# Patient Record
Sex: FEMALE | Race: ASIAN | NOT HISPANIC OR LATINO | Employment: UNEMPLOYED | URBAN - METROPOLITAN AREA
[De-identification: names, ages, dates, MRNs, and addresses within clinical notes are randomized per-mention and may not be internally consistent; named-entity substitution may affect disease eponyms.]

---

## 2018-09-13 DIAGNOSIS — J18.9 PNEUMONIA: Primary | ICD-10-CM

## 2019-05-30 DIAGNOSIS — I50.30 UNSPECIFIED DIASTOLIC (CONGESTIVE) HEART FAILURE: Primary | ICD-10-CM

## 2020-01-09 DIAGNOSIS — I27.0 IDIOPATHIC PULMONARY ARTERIOSCLEROSIS: ICD-10-CM

## 2020-01-09 DIAGNOSIS — I50.30 DIASTOLIC HF (HEART FAILURE): Primary | ICD-10-CM

## 2020-01-09 DIAGNOSIS — I27.21 PULMONARY ARTERIAL HYPERTENSION: ICD-10-CM

## 2022-07-18 ENCOUNTER — HOSPITAL ENCOUNTER (OUTPATIENT)
Facility: HOSPITAL | Age: 87
Discharge: HOME | End: 2022-07-19
Attending: EMERGENCY MEDICINE | Admitting: HOSPITALIST
Payer: MEDICARE

## 2022-07-18 DIAGNOSIS — R55 NEAR SYNCOPE: Primary | ICD-10-CM

## 2022-07-18 DIAGNOSIS — R55 SYNCOPE: ICD-10-CM

## 2022-07-18 DIAGNOSIS — U07.1 COVID-19: ICD-10-CM

## 2022-07-18 LAB
ALBUMIN SERPL BCP-MCNC: 3.4 G/DL (ref 3.5–5.2)
ALP SERPL-CCNC: 55 U/L (ref 55–135)
ALT SERPL W/O P-5'-P-CCNC: 14 U/L (ref 10–44)
ANION GAP SERPL CALC-SCNC: 12 MMOL/L (ref 8–16)
AST SERPL-CCNC: 31 U/L (ref 10–40)
BACTERIA #/AREA URNS HPF: NORMAL /HPF
BASOPHILS # BLD AUTO: 0.03 K/UL (ref 0–0.2)
BASOPHILS NFR BLD: 0.2 % (ref 0–1.9)
BILIRUB SERPL-MCNC: 0.5 MG/DL (ref 0.1–1)
BILIRUB UR QL STRIP: NEGATIVE
BNP SERPL-MCNC: 822 PG/ML (ref 0–99)
BUN SERPL-MCNC: 39 MG/DL (ref 10–30)
CALCIUM SERPL-MCNC: 9.9 MG/DL (ref 8.7–10.5)
CHLORIDE SERPL-SCNC: 105 MMOL/L (ref 95–110)
CK SERPL-CCNC: 51 U/L (ref 20–180)
CLARITY UR: CLEAR
CO2 SERPL-SCNC: 22 MMOL/L (ref 23–29)
COLOR UR: YELLOW
CREAT SERPL-MCNC: 1.1 MG/DL (ref 0.5–1.4)
CRP SERPL-MCNC: 12.2 MG/L (ref 0–8.2)
CTP QC/QA: YES
D DIMER PPP IA.FEU-MCNC: 1.86 MG/L FEU
DIFFERENTIAL METHOD: ABNORMAL
EOSINOPHIL # BLD AUTO: 0 K/UL (ref 0–0.5)
EOSINOPHIL NFR BLD: 0.3 % (ref 0–8)
ERYTHROCYTE [DISTWIDTH] IN BLOOD BY AUTOMATED COUNT: 14.1 % (ref 11.5–14.5)
EST. GFR  (AFRICAN AMERICAN): 50 ML/MIN/1.73 M^2
EST. GFR  (NON AFRICAN AMERICAN): 43 ML/MIN/1.73 M^2
FERRITIN SERPL-MCNC: 174 NG/ML (ref 20–300)
GLUCOSE SERPL-MCNC: 159 MG/DL (ref 70–110)
GLUCOSE UR QL STRIP: NEGATIVE
HCT VFR BLD AUTO: 35.4 % (ref 37–48.5)
HGB BLD-MCNC: 11.8 G/DL (ref 12–16)
HGB UR QL STRIP: NEGATIVE
HYALINE CASTS #/AREA URNS LPF: 0 /LPF
IMM GRANULOCYTES # BLD AUTO: 0.06 K/UL (ref 0–0.04)
IMM GRANULOCYTES NFR BLD AUTO: 0.4 % (ref 0–0.5)
KETONES UR QL STRIP: NEGATIVE
LACTATE SERPL-SCNC: 1.1 MMOL/L (ref 0.5–2.2)
LACTATE SERPL-SCNC: 2.1 MMOL/L (ref 0.5–2.2)
LDH SERPL L TO P-CCNC: 579 U/L (ref 110–260)
LEUKOCYTE ESTERASE UR QL STRIP: NEGATIVE
LIPASE SERPL-CCNC: 12 U/L (ref 4–60)
LYMPHOCYTES # BLD AUTO: 1 K/UL (ref 1–4.8)
LYMPHOCYTES NFR BLD: 6.9 % (ref 18–48)
MAGNESIUM SERPL-MCNC: 2.1 MG/DL (ref 1.6–2.6)
MCH RBC QN AUTO: 28.1 PG (ref 27–31)
MCHC RBC AUTO-ENTMCNC: 33.3 G/DL (ref 32–36)
MCV RBC AUTO: 84 FL (ref 82–98)
MICROSCOPIC COMMENT: NORMAL
MONOCYTES # BLD AUTO: 1 K/UL (ref 0.3–1)
MONOCYTES NFR BLD: 6.8 % (ref 4–15)
NEUTROPHILS # BLD AUTO: 12.5 K/UL (ref 1.8–7.7)
NEUTROPHILS NFR BLD: 85.4 % (ref 38–73)
NITRITE UR QL STRIP: NEGATIVE
NRBC BLD-RTO: 0 /100 WBC
PH UR STRIP: 7 [PH] (ref 5–8)
PLATELET # BLD AUTO: 206 K/UL (ref 150–450)
PMV BLD AUTO: 11.7 FL (ref 9.2–12.9)
POTASSIUM SERPL-SCNC: 4.9 MMOL/L (ref 3.5–5.1)
PROCALCITONIN SERPL IA-MCNC: 0.02 NG/ML
PROT SERPL-MCNC: 8.4 G/DL (ref 6–8.4)
PROT UR QL STRIP: ABNORMAL
RBC # BLD AUTO: 4.2 M/UL (ref 4–5.4)
RBC #/AREA URNS HPF: 1 /HPF (ref 0–4)
SARS-COV-2 RDRP RESP QL NAA+PROBE: POSITIVE
SODIUM SERPL-SCNC: 139 MMOL/L (ref 136–145)
SP GR UR STRIP: 1.01 (ref 1–1.03)
TROPONIN I SERPL DL<=0.01 NG/ML-MCNC: 0.06 NG/ML (ref 0–0.03)
TROPONIN I SERPL DL<=0.01 NG/ML-MCNC: 0.1 NG/ML (ref 0–0.03)
URN SPEC COLLECT METH UR: ABNORMAL
UROBILINOGEN UR STRIP-ACNC: NEGATIVE EU/DL
WBC # BLD AUTO: 14.59 K/UL (ref 3.9–12.7)
WBC #/AREA URNS HPF: 1 /HPF (ref 0–5)

## 2022-07-18 PROCEDURE — 83690 ASSAY OF LIPASE: CPT | Performed by: EMERGENCY MEDICINE

## 2022-07-18 PROCEDURE — 27100098 HC SPACER

## 2022-07-18 PROCEDURE — 85025 COMPLETE CBC W/AUTO DIFF WBC: CPT | Performed by: EMERGENCY MEDICINE

## 2022-07-18 PROCEDURE — 25000003 PHARM REV CODE 250: Performed by: EMERGENCY MEDICINE

## 2022-07-18 PROCEDURE — 82728 ASSAY OF FERRITIN: CPT | Performed by: EMERGENCY MEDICINE

## 2022-07-18 PROCEDURE — 99285 EMERGENCY DEPT VISIT HI MDM: CPT | Mod: 25

## 2022-07-18 PROCEDURE — 85379 FIBRIN DEGRADATION QUANT: CPT | Performed by: EMERGENCY MEDICINE

## 2022-07-18 PROCEDURE — 94761 N-INVAS EAR/PLS OXIMETRY MLT: CPT

## 2022-07-18 PROCEDURE — 25000242 PHARM REV CODE 250 ALT 637 W/ HCPCS: Performed by: PHYSICIAN ASSISTANT

## 2022-07-18 PROCEDURE — 83615 LACTATE (LD) (LDH) ENZYME: CPT | Performed by: EMERGENCY MEDICINE

## 2022-07-18 PROCEDURE — 84145 PROCALCITONIN (PCT): CPT | Performed by: EMERGENCY MEDICINE

## 2022-07-18 PROCEDURE — 83880 ASSAY OF NATRIURETIC PEPTIDE: CPT | Performed by: EMERGENCY MEDICINE

## 2022-07-18 PROCEDURE — 84484 ASSAY OF TROPONIN QUANT: CPT | Mod: 91 | Performed by: PHYSICIAN ASSISTANT

## 2022-07-18 PROCEDURE — 36000 PLACE NEEDLE IN VEIN: CPT

## 2022-07-18 PROCEDURE — 80053 COMPREHEN METABOLIC PANEL: CPT | Performed by: EMERGENCY MEDICINE

## 2022-07-18 PROCEDURE — U0002 COVID-19 LAB TEST NON-CDC: HCPCS | Performed by: EMERGENCY MEDICINE

## 2022-07-18 PROCEDURE — 84484 ASSAY OF TROPONIN QUANT: CPT | Performed by: EMERGENCY MEDICINE

## 2022-07-18 PROCEDURE — 82550 ASSAY OF CK (CPK): CPT | Performed by: EMERGENCY MEDICINE

## 2022-07-18 PROCEDURE — G0378 HOSPITAL OBSERVATION PER HR: HCPCS

## 2022-07-18 PROCEDURE — 87040 BLOOD CULTURE FOR BACTERIA: CPT | Mod: 59 | Performed by: EMERGENCY MEDICINE

## 2022-07-18 PROCEDURE — 94640 AIRWAY INHALATION TREATMENT: CPT

## 2022-07-18 PROCEDURE — 83605 ASSAY OF LACTIC ACID: CPT | Mod: 91 | Performed by: EMERGENCY MEDICINE

## 2022-07-18 PROCEDURE — 86140 C-REACTIVE PROTEIN: CPT | Performed by: EMERGENCY MEDICINE

## 2022-07-18 PROCEDURE — 81000 URINALYSIS NONAUTO W/SCOPE: CPT | Performed by: EMERGENCY MEDICINE

## 2022-07-18 PROCEDURE — 83735 ASSAY OF MAGNESIUM: CPT | Performed by: EMERGENCY MEDICINE

## 2022-07-18 PROCEDURE — 83036 HEMOGLOBIN GLYCOSYLATED A1C: CPT | Performed by: PHYSICIAN ASSISTANT

## 2022-07-18 RX ORDER — ALBUTEROL SULFATE 90 UG/1
2 AEROSOL, METERED RESPIRATORY (INHALATION) EVERY 6 HOURS
Status: DISCONTINUED | OUTPATIENT
Start: 2022-07-18 | End: 2022-07-18

## 2022-07-18 RX ORDER — ATORVASTATIN CALCIUM 10 MG/1
10 TABLET, FILM COATED ORAL DAILY
Status: DISCONTINUED | OUTPATIENT
Start: 2022-07-19 | End: 2022-07-19 | Stop reason: HOSPADM

## 2022-07-18 RX ORDER — IBUPROFEN 200 MG
24 TABLET ORAL
Status: DISCONTINUED | OUTPATIENT
Start: 2022-07-18 | End: 2022-07-19 | Stop reason: HOSPADM

## 2022-07-18 RX ORDER — QUETIAPINE FUMARATE 25 MG/1
25 TABLET, FILM COATED ORAL
Status: DISCONTINUED | OUTPATIENT
Start: 2022-07-18 | End: 2022-07-18

## 2022-07-18 RX ORDER — GLUCAGON 1 MG
1 KIT INJECTION
Status: DISCONTINUED | OUTPATIENT
Start: 2022-07-18 | End: 2022-07-19 | Stop reason: HOSPADM

## 2022-07-18 RX ORDER — NIFEDIPINE 30 MG/1
60 TABLET, EXTENDED RELEASE ORAL DAILY
Refills: 1 | Status: DISCONTINUED | OUTPATIENT
Start: 2022-07-19 | End: 2022-07-18

## 2022-07-18 RX ORDER — ACETAMINOPHEN 500 MG
500 TABLET ORAL EVERY 6 HOURS PRN
Status: DISCONTINUED | OUTPATIENT
Start: 2022-07-18 | End: 2022-07-19 | Stop reason: HOSPADM

## 2022-07-18 RX ORDER — INSULIN ASPART 100 [IU]/ML
0-5 INJECTION, SOLUTION INTRAVENOUS; SUBCUTANEOUS
Status: DISCONTINUED | OUTPATIENT
Start: 2022-07-18 | End: 2022-07-19 | Stop reason: HOSPADM

## 2022-07-18 RX ORDER — ALBUTEROL SULFATE 90 UG/1
2 AEROSOL, METERED RESPIRATORY (INHALATION) 2 TIMES DAILY
Status: DISCONTINUED | OUTPATIENT
Start: 2022-07-19 | End: 2022-07-19 | Stop reason: HOSPADM

## 2022-07-18 RX ORDER — IBUPROFEN 200 MG
16 TABLET ORAL
Status: DISCONTINUED | OUTPATIENT
Start: 2022-07-18 | End: 2022-07-19 | Stop reason: HOSPADM

## 2022-07-18 RX ORDER — NIFEDIPINE 30 MG/1
30 TABLET, EXTENDED RELEASE ORAL DAILY
Status: DISCONTINUED | OUTPATIENT
Start: 2022-07-19 | End: 2022-07-19 | Stop reason: HOSPADM

## 2022-07-18 RX ORDER — AMOXICILLIN 250 MG
1 CAPSULE ORAL DAILY PRN
Status: DISCONTINUED | OUTPATIENT
Start: 2022-07-18 | End: 2022-07-19 | Stop reason: HOSPADM

## 2022-07-18 RX ORDER — TALC
6 POWDER (GRAM) TOPICAL NIGHTLY PRN
Status: DISCONTINUED | OUTPATIENT
Start: 2022-07-18 | End: 2022-07-19 | Stop reason: HOSPADM

## 2022-07-18 RX ORDER — SODIUM CHLORIDE 0.9 % (FLUSH) 0.9 %
10 SYRINGE (ML) INJECTION
Status: DISCONTINUED | OUTPATIENT
Start: 2022-07-18 | End: 2022-07-19 | Stop reason: HOSPADM

## 2022-07-18 RX ORDER — BENZONATATE 100 MG/1
100 CAPSULE ORAL 3 TIMES DAILY PRN
Status: DISCONTINUED | OUTPATIENT
Start: 2022-07-18 | End: 2022-07-19 | Stop reason: HOSPADM

## 2022-07-18 RX ORDER — SODIUM CHLORIDE 9 MG/ML
125 INJECTION, SOLUTION INTRAVENOUS ONCE
Status: COMPLETED | OUTPATIENT
Start: 2022-07-18 | End: 2022-07-18

## 2022-07-18 RX ORDER — FUROSEMIDE 20 MG/1
20 TABLET ORAL DAILY
Status: DISCONTINUED | OUTPATIENT
Start: 2022-07-19 | End: 2022-07-19 | Stop reason: HOSPADM

## 2022-07-18 RX ORDER — ASCORBIC ACID 500 MG
500 TABLET ORAL 2 TIMES DAILY
Status: DISCONTINUED | OUTPATIENT
Start: 2022-07-18 | End: 2022-07-19 | Stop reason: HOSPADM

## 2022-07-18 RX ADMIN — SODIUM CHLORIDE 125 ML/HR: 0.9 INJECTION, SOLUTION INTRAVENOUS at 04:07

## 2022-07-18 RX ADMIN — ALBUTEROL SULFATE 2 PUFF: 90 AEROSOL, METERED RESPIRATORY (INHALATION) at 07:07
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
## 2022-07-18 NOTE — ASSESSMENT & PLAN NOTE
Lab Results   Component Value Date    HGBA1C 6.4 (H) 09/12/2013     Will start sliding scale insulin, diabetic diet, goal -180

## 2022-07-18 NOTE — ED NOTES
Noted pt to be restless. Pulling at lines and attempting to climb out of bed. Daughter having difficulty to redirect pt. Daughter stated that she will be going home soon. Pt will need a 'sitter'. Charge Nurse Yesenia made aware of situation.

## 2022-07-19 VITALS
TEMPERATURE: 98 F | WEIGHT: 127.88 LBS | OXYGEN SATURATION: 94 % | HEIGHT: 63 IN | DIASTOLIC BLOOD PRESSURE: 67 MMHG | SYSTOLIC BLOOD PRESSURE: 158 MMHG | BODY MASS INDEX: 22.66 KG/M2 | HEART RATE: 70 BPM | RESPIRATION RATE: 16 BRPM

## 2022-07-19 DIAGNOSIS — U07.1 COVID-19 VIRUS DETECTED: ICD-10-CM

## 2022-07-19 LAB
ALBUMIN SERPL BCP-MCNC: 3.3 G/DL (ref 3.5–5.2)
ALP SERPL-CCNC: 50 U/L (ref 55–135)
ALT SERPL W/O P-5'-P-CCNC: 12 U/L (ref 10–44)
ANION GAP SERPL CALC-SCNC: 12 MMOL/L (ref 8–16)
ASCENDING AORTA: 3.27 CM
AST SERPL-CCNC: 22 U/L (ref 10–40)
AV INDEX (PROSTH): 0.39
AV MEAN GRADIENT: 23 MMHG
AV PEAK GRADIENT: 41 MMHG
AV VALVE AREA: 0.96 CM2
AV VELOCITY RATIO: 0.36
BASOPHILS # BLD AUTO: 0.02 K/UL (ref 0–0.2)
BASOPHILS NFR BLD: 0.2 % (ref 0–1.9)
BILIRUB SERPL-MCNC: 0.6 MG/DL (ref 0.1–1)
BSA FOR ECHO PROCEDURE: 1.61 M2
BUN SERPL-MCNC: 29 MG/DL (ref 10–30)
CALCIUM SERPL-MCNC: 9.2 MG/DL (ref 8.7–10.5)
CHLORIDE SERPL-SCNC: 107 MMOL/L (ref 95–110)
CO2 SERPL-SCNC: 21 MMOL/L (ref 23–29)
CREAT SERPL-MCNC: 1 MG/DL (ref 0.5–1.4)
CV ECHO LV RWT: 0.52 CM
DIFFERENTIAL METHOD: ABNORMAL
DOP CALC AO PEAK VEL: 3.2 M/S
DOP CALC AO VTI: 69.15 CM
DOP CALC LVOT AREA: 2.5 CM2
DOP CALC LVOT DIAMETER: 1.77 CM
DOP CALC LVOT PEAK VEL: 1.14 M/S
DOP CALC LVOT STROKE VOLUME: 66.5 CM3
DOP CALCLVOT PEAK VEL VTI: 27.04 CM
E WAVE DECELERATION TIME: 135.72 MSEC
E/A RATIO: 1.45
E/E' RATIO: 31 M/S
ECHO LV POSTERIOR WALL: 1.09 CM (ref 0.6–1.1)
EJECTION FRACTION: 70 %
EOSINOPHIL # BLD AUTO: 0 K/UL (ref 0–0.5)
EOSINOPHIL NFR BLD: 0 % (ref 0–8)
ERYTHROCYTE [DISTWIDTH] IN BLOOD BY AUTOMATED COUNT: 14.3 % (ref 11.5–14.5)
EST. GFR  (AFRICAN AMERICAN): 56 ML/MIN/1.73 M^2
EST. GFR  (NON AFRICAN AMERICAN): 49 ML/MIN/1.73 M^2
ESTIMATED AVG GLUCOSE: 146 MG/DL (ref 68–131)
FRACTIONAL SHORTENING: 25 % (ref 28–44)
GLUCOSE SERPL-MCNC: 177 MG/DL (ref 70–110)
HBA1C MFR BLD: 6.7 % (ref 4–5.6)
HCT VFR BLD AUTO: 34.4 % (ref 37–48.5)
HGB BLD-MCNC: 10.8 G/DL (ref 12–16)
HR MV ECHO: 71 BPM
IMM GRANULOCYTES # BLD AUTO: 0.05 K/UL (ref 0–0.04)
IMM GRANULOCYTES NFR BLD AUTO: 0.5 % (ref 0–0.5)
INTERVENTRICULAR SEPTUM: 1.13 CM (ref 0.6–1.1)
IVRT: 62.28 MSEC
LA MAJOR: 6.98 CM
LA MINOR: 6.47 CM
LA WIDTH: 4.66 CM
LEFT ATRIUM SIZE: 3.54 CM
LEFT ATRIUM VOLUME INDEX: 58.9 ML/M2
LEFT ATRIUM VOLUME: 94.16 CM3
LEFT INTERNAL DIMENSION IN SYSTOLE: 3.12 CM (ref 2.1–4)
LEFT VENTRICLE DIASTOLIC VOLUME INDEX: 47.88 ML/M2
LEFT VENTRICLE DIASTOLIC VOLUME: 76.61 ML
LEFT VENTRICLE MASS INDEX: 98 G/M2
LEFT VENTRICLE SYSTOLIC VOLUME INDEX: 24.1 ML/M2
LEFT VENTRICLE SYSTOLIC VOLUME: 38.48 ML
LEFT VENTRICULAR INTERNAL DIMENSION IN DIASTOLE: 4.16 CM (ref 3.5–6)
LEFT VENTRICULAR MASS: 156.77 G
LV LATERAL E/E' RATIO: 31 M/S
LV SEPTAL E/E' RATIO: 31 M/S
LYMPHOCYTES # BLD AUTO: 0.8 K/UL (ref 1–4.8)
LYMPHOCYTES NFR BLD: 7.1 % (ref 18–48)
MCH RBC QN AUTO: 27.2 PG (ref 27–31)
MCHC RBC AUTO-ENTMCNC: 31.4 G/DL (ref 32–36)
MCV RBC AUTO: 87 FL (ref 82–98)
MONOCYTES # BLD AUTO: 1 K/UL (ref 0.3–1)
MONOCYTES NFR BLD: 9.3 % (ref 4–15)
MV MEAN GRADIENT: 4 MMHG
MV PEAK A VEL: 1.07 M/S
MV PEAK E VEL: 1.55 M/S
MV STENOSIS PRESSURE HALF TIME: 75 MS
MV VALVE AREA P 1/2 METHOD: 2.93 CM2
NEUTROPHILS # BLD AUTO: 8.9 K/UL (ref 1.8–7.7)
NEUTROPHILS NFR BLD: 82.9 % (ref 38–73)
NRBC BLD-RTO: 0 /100 WBC
PISA TR MAX VEL: 3.26 M/S
PLATELET # BLD AUTO: 203 K/UL (ref 150–450)
PMV BLD AUTO: 12.4 FL (ref 9.2–12.9)
POCT GLUCOSE: 136 MG/DL (ref 70–110)
POCT GLUCOSE: 177 MG/DL (ref 70–110)
POTASSIUM SERPL-SCNC: 4.7 MMOL/L (ref 3.5–5.1)
PROT SERPL-MCNC: 7.9 G/DL (ref 6–8.4)
PV PEAK VELOCITY: 0.93 CM/S
RA MAJOR: 5.74 CM
RA PRESSURE: 15 MMHG
RA WIDTH: 3.93 CM
RBC # BLD AUTO: 3.97 M/UL (ref 4–5.4)
RIGHT VENTRICULAR END-DIASTOLIC DIMENSION: 3.35 CM
RV TISSUE DOPPLER FREE WALL SYSTOLIC VELOCITY 1 (APICAL 4 CHAMBER VIEW): 12.97 CM/S
SINUS: 3.21 CM
SODIUM SERPL-SCNC: 140 MMOL/L (ref 136–145)
STJ: 2.15 CM
TDI LATERAL: 0.05 M/S
TDI SEPTAL: 0.05 M/S
TDI: 0.05 M/S
TR MAX PG: 43 MMHG
TRICUSPID ANNULAR PLANE SYSTOLIC EXCURSION: 1.91 CM
TROPONIN I SERPL DL<=0.01 NG/ML-MCNC: 0.1 NG/ML (ref 0–0.03)
TV REST PULMONARY ARTERY PRESSURE: 58 MMHG
WBC # BLD AUTO: 10.73 K/UL (ref 3.9–12.7)

## 2022-07-19 PROCEDURE — 97161 PT EVAL LOW COMPLEX 20 MIN: CPT

## 2022-07-19 PROCEDURE — 94760 N-INVAS EAR/PLS OXIMETRY 1: CPT

## 2022-07-19 PROCEDURE — 85025 COMPLETE CBC W/AUTO DIFF WBC: CPT | Performed by: PHYSICIAN ASSISTANT

## 2022-07-19 PROCEDURE — 97535 SELF CARE MNGMENT TRAINING: CPT

## 2022-07-19 PROCEDURE — 25000003 PHARM REV CODE 250: Performed by: PHYSICIAN ASSISTANT

## 2022-07-19 PROCEDURE — 36415 COLL VENOUS BLD VENIPUNCTURE: CPT | Performed by: PHYSICIAN ASSISTANT

## 2022-07-19 PROCEDURE — 84484 ASSAY OF TROPONIN QUANT: CPT | Performed by: PHYSICIAN ASSISTANT

## 2022-07-19 PROCEDURE — G0378 HOSPITAL OBSERVATION PER HR: HCPCS

## 2022-07-19 PROCEDURE — 97165 OT EVAL LOW COMPLEX 30 MIN: CPT

## 2022-07-19 PROCEDURE — 25000003 PHARM REV CODE 250: Performed by: NURSE PRACTITIONER

## 2022-07-19 PROCEDURE — 94640 AIRWAY INHALATION TREATMENT: CPT

## 2022-07-19 PROCEDURE — 80053 COMPREHEN METABOLIC PANEL: CPT | Performed by: PHYSICIAN ASSISTANT

## 2022-07-19 RX ORDER — ASPIRIN 81 MG/1
81 TABLET ORAL DAILY
Qty: 30 TABLET | Refills: 0 | Status: SHIPPED | OUTPATIENT
Start: 2022-07-20 | End: 2024-03-12

## 2022-07-19 RX ORDER — ASPIRIN 81 MG/1
81 TABLET ORAL DAILY
Status: DISCONTINUED | OUTPATIENT
Start: 2022-07-19 | End: 2022-07-19 | Stop reason: HOSPADM

## 2022-07-19 RX ADMIN — NIFEDIPINE 30 MG: 30 TABLET, FILM COATED, EXTENDED RELEASE ORAL at 12:07

## 2022-07-19 RX ADMIN — ALBUTEROL SULFATE 2 PUFF: 90 AEROSOL, METERED RESPIRATORY (INHALATION) at 09:07

## 2022-07-19 RX ADMIN — ATORVASTATIN CALCIUM 10 MG: 10 TABLET, FILM COATED ORAL at 12:07

## 2022-07-19 RX ADMIN — THERA TABS 1 TABLET: TAB at 12:07

## 2022-07-19 RX ADMIN — OXYCODONE HYDROCHLORIDE AND ACETAMINOPHEN 500 MG: 500 TABLET ORAL at 12:07

## 2022-07-19 RX ADMIN — ACETAMINOPHEN 500 MG: 500 TABLET ORAL at 12:07

## 2022-07-19 RX ADMIN — Medication 6 MG: at 12:07

## 2022-07-19 RX ADMIN — ASPIRIN 81 MG: 81 TABLET, COATED ORAL at 11:07

## 2022-07-19 RX ADMIN — FUROSEMIDE 20 MG: 20 TABLET ORAL at 12:07

## 2022-07-19 NOTE — SUBJECTIVE & OBJECTIVE
Interval History: Patient  with confusion presently.  Appears comfortable.  Troponin slightly elevated (likely due to covid). Denies chest pain and dyspnea.     Attempted to use . Patient not communicating effectively with .      Attempted to call son- wrong number in Epic. Notified SW.     Review of Systems   Unable to perform ROS: Dementia   Objective:     Vital Signs (Most Recent):  Temp: 97.8 °F (36.6 °C) (07/19/22 0932)  Pulse: 73 (07/19/22 0932)  Resp: 16 (07/19/22 0932)  BP: (!) 151/65 (07/19/22 0932)  SpO2: 95 % (07/19/22 0932)   Vital Signs (24h Range):  Temp:  [97.8 °F (36.6 °C)-99.2 °F (37.3 °C)] 97.8 °F (36.6 °C)  Pulse:  [] 73  Resp:  [16-20] 16  SpO2:  [93 %-95 %] 95 %  BP: (145-165)/(65-77) 151/65     Weight: 58 kg (127 lb 13.9 oz)  Body mass index is 22.65 kg/m².  No intake or output data in the 24 hours ending 07/19/22 1125   Physical Exam  Vitals and nursing note reviewed.   Constitutional:       Appearance: She is well-developed.   HENT:      Head: Normocephalic.   Cardiovascular:      Rate and Rhythm: Normal rate and regular rhythm.      Heart sounds: Murmur heard.   Pulmonary:      Effort: Pulmonary effort is normal.      Breath sounds: Normal breath sounds.   Abdominal:      General: Bowel sounds are normal.      Palpations: Abdomen is soft.   Musculoskeletal:         General: Normal range of motion.   Skin:     General: Skin is warm and dry.   Neurological:      Mental Status: She is alert. She is disoriented.       Significant Labs: All pertinent labs within the past 24 hours have been reviewed.  BMP:   Recent Labs   Lab 07/18/22  1553 07/19/22  0413   * 177*    140   K 4.9 4.7    107   CO2 22* 21*   BUN 39* 29   CREATININE 1.1 1.0   CALCIUM 9.9 9.2   MG 2.1  --      CBC:   Recent Labs   Lab 07/18/22  1553 07/19/22  0413   WBC 14.59* 10.73   HGB 11.8* 10.8*   HCT 35.4* 34.4*    203       Significant Imaging: I have reviewed all pertinent  imaging results/findings within the past 24 hours.

## 2022-07-19 NOTE — ASSESSMENT & PLAN NOTE
Per family no syncope, but 2 falls secondary to weakness. CT head without acute process, CT abdomen without acute process. Troponin 0.063  without Chest pain or SOB. Per chat review history of HFpEF no recent echo to review.   Troponin trend  Telemetry  Echo  Orthostatic BP  Continue home statin

## 2022-07-19 NOTE — HPI
Veronica Campos 89 y.o. female with HTN, HLD, DM, presents to the hospital chief complaint of fall.  Per the daughter she had increasing urinary frequency last night, and today was found after a fall while sitting on the toilet.  She was then helped up and down the weight back to the house she had another fall.  The daughter denies this pain loss of consciousness.  Patient reports she feels generally weak.  She also complains of feeling that she needs to urinate but is unable to urinate.  There have been no complaints of fevers shortness of breath vomiting dizziness melena hematuria slurred speech numbness or weakness of an extremity.  Per the daughter she is mostly independent in her ADLs.  She is normally ambulatory at baseline.    In the ED, found to be COVID positive, O2 saturation is 95%, , troponin 0.06, lactic acid 1 point CT scan abdomen pelvis without contrast without acute abnormality CT head without acute abnormality, chest x-ray with nonspecific interstitial change, creatinine 1.1, white blood cell count 14.     Attempted to use  via language line Rosey ID# [id number] and Raina ID# [id number]. Patient unable to use  due to baseline difficulty hearing per daughter. Daughter acts as
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
## 2022-07-19 NOTE — ASSESSMENT & PLAN NOTE
Patient is chronically on statin.will continue for now. Monitor clinically. Last LDL was   Lab Results   Component Value Date    LDLCALC 65.0 09/12/2013

## 2022-07-19 NOTE — PLAN OF CARE
Problem: Physical Therapy  Goal: Physical Therapy Goal  Outcome: Adequate for Care Transition   Initial PT evaluation performed.  Pt could benefit from skilled PT services 3-5x/wk in order to maximize function prior to D/C.  OK for D/C home with family at time of D/C.
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
## 2022-07-19 NOTE — ED NOTES
Pt transferred to room 324. Pt resting quietly in bed at this time, resp even and unlabored, vitals WNL
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
## 2022-07-19 NOTE — PROGRESS NOTES
Call placed to pts siena Gonzales to advise of pts plan to d/c to home on today and that she is ready to be picked up.  Son reported he and his brother will be on their way to get the pt      Update DIANN Guzman NP of this information

## 2022-07-19 NOTE — NURSING
Call to son (maricel) in chart # listed is not correct. Contacted SW to find out what # she called unavailable at this time. Escalated to floor charge nurse.

## 2022-07-19 NOTE — PROGRESS NOTES
[facility] Medicine  Progress Note    Patient Name: Veronica Campos  MRN: [id number]  Patient Class: OP- Observation   Admission Date: 7/18/2022  Length of Stay: 0 days  Attending Physician: Roel Rowland MD  Primary Care Provider: Primary Doctor No        Subjective:     Principal Problem:Syncope        HPI:  Veronica Campos 89 y.o. female with HTN, HLD, DM, presents to the hospital chief complaint of fall.  Per the daughter she had increasing urinary frequency last night, and today was found after a fall while sitting on the toilet.  She was then helped up and down the weight back to the house she had another fall.  The daughter denies this pain loss of consciousness.  Patient reports she feels generally weak.  She also complains of feeling that she needs to urinate but is unable to urinate.  There have been no complaints of fevers shortness of breath vomiting dizziness melena hematuria slurred speech numbness or weakness of an extremity.  Per the daughter she is mostly independent in her ADLs.  She is normally ambulatory at baseline.    In the ED, found to be COVID positive, O2 saturation is 95%, , troponin 0.06, lactic acid 1 point CT scan abdomen pelvis without contrast without acute abnormality CT head without acute abnormality, chest x-ray with nonspecific interstitial change, creatinine 1.1, white blood cell count 14.     Attempted to use  via language line Rosey ID# [id number] and Raina ID# [id number]. Patient unable to use  due to baseline difficulty hearing per daughter. Daughter acts as       Overview/Hospital Course:  No notes on file    Interval History: Patient  with confusion presently.  Appears comfortable.  Troponin slightly elevated (likely due to covid). Denies chest pain and dyspnea.     Attempted to use . Patient not communicating effectively with .      Attempted to call son- wrong number in Epic. Notified SW.      Review of Systems   Unable to perform ROS: Dementia   Objective:     Vital Signs (Most Recent):  Temp: 97.8 °F (36.6 °C) (07/19/22 0932)  Pulse: 73 (07/19/22 0932)  Resp: 16 (07/19/22 0932)  BP: (!) 151/65 (07/19/22 0932)  SpO2: 95 % (07/19/22 0932)   Vital Signs (24h Range):  Temp:  [97.8 °F (36.6 °C)-99.2 °F (37.3 °C)] 97.8 °F (36.6 °C)  Pulse:  [] 73  Resp:  [16-20] 16  SpO2:  [93 %-95 %] 95 %  BP: (145-165)/(65-77) 151/65     Weight: 58 kg (127 lb 13.9 oz)  Body mass index is 22.65 kg/m².  No intake or output data in the 24 hours ending 07/19/22 1125   Physical Exam  Vitals and nursing note reviewed.   Constitutional:       Appearance: She is well-developed.   HENT:      Head: Normocephalic.   Cardiovascular:      Rate and Rhythm: Normal rate and regular rhythm.      Heart sounds: Murmur heard.   Pulmonary:      Effort: Pulmonary effort is normal.      Breath sounds: Normal breath sounds.   Abdominal:      General: Bowel sounds are normal.      Palpations: Abdomen is soft.   Musculoskeletal:         General: Normal range of motion.   Skin:     General: Skin is warm and dry.   Neurological:      Mental Status: She is alert. She is disoriented.       Significant Labs: All pertinent labs within the past 24 hours have been reviewed.  BMP:   Recent Labs   Lab 07/18/22  1553 07/19/22  0413   * 177*    140   K 4.9 4.7    107   CO2 22* 21*   BUN 39* 29   CREATININE 1.1 1.0   CALCIUM 9.9 9.2   MG 2.1  --      CBC:   Recent Labs   Lab 07/18/22  1553 07/19/22  0413   WBC 14.59* 10.73   HGB 11.8* 10.8*   HCT 35.4* 34.4*    203       Significant Imaging: I have reviewed all pertinent imaging results/findings within the past 24 hours.      Assessment/Plan:      * Syncope  Per family no syncope, but 2 falls secondary to weakness. CT head without acute process, CT abdomen without acute process. Troponin 0.063  without Chest pain or SOB. Per chat review history of HFpEF no recent echo  to review.   Troponin trend  Telemetry  Echo  Orthostatic BP  Continue home statin    COVID-19 virus infection  With complaints of weakness and fatigue. Also with complaints of increased urinary frequency and dysuria per daughter.chest x-ray with mild non-specific interstitial changes.  Will hold starting remdesivir/full dose lovenox/dexamethasone as on RA with 95% o2 saturations and no respiratory complaints  Started on vitamin c/multivitamin/MDI  Airborne/contact/droplet isolation status  Full code  Main complaints are urinary symptoms leukocytosis possibly related to UTI.   Found to be retaining urine in ED of 900cc. Metzger placed.-pending UA to start antibiotics if UTI  Will give lasix as chest x-ray with mild interstitial disease and elevated BNP with care everywhere history of HFpEF    Type 2 diabetes mellitus, controlled  Lab Results   Component Value Date    HGBA1C 6.4 (H) 09/12/2013     Will start sliding scale insulin, diabetic diet, goal -180    Combined hyperlipidemia associated with type 2 diabetes mellitus   Patient is chronically on statin.will continue for now. Monitor clinically. Last LDL was   Lab Results   Component Value Date    LDLCALC 65.0 09/12/2013          Hypertension associated with diabetes  Fair control continue home nifedipine      VTE Risk Mitigation (From admission, onward)         Ordered     Place BOUBACAR hose  Until discontinued         07/18/22 1945     Place sequential compression device  Until discontinued         07/18/22 1945                Discharge Planning   URIEL:      Code Status: Full Code   Is the patient medically ready for discharge?:     Reason for patient still in hospital (select all that apply): Laboratory test and Treatment  Discharge Plan A: Home with family (with instructions to follow up post hospital discharge)                  Sarah Guzman NP  Department of Hospital Medicine   [facility] - [facility]

## 2022-07-19 NOTE — ASSESSMENT & PLAN NOTE
With complaints of weakness and fatigue. Also with complaints of increased urinary frequency and dysuria per daughter.chest x-ray with mild non-specific interstitial changes.  Will hold starting remdesivir/full dose lovenox/dexamethasone as on RA with 95% o2 saturations  Started on vitamin c/multivitamin/MDI  Airborne/contact/droplet isolation status  Full code  Main complaints are urinary symptoms leukocytosis possibly related to UTI.   Will obtain bladder scan and In and out Catheterization for UA

## 2022-07-19 NOTE — ASSESSMENT & PLAN NOTE
With complaints of weakness and fatigue. Also with complaints of increased urinary frequency and dysuria per daughter.chest x-ray with mild non-specific interstitial changes.  Will hold starting remdesivir/full dose lovenox/dexamethasone as on RA with 95% o2 saturations and no respiratory complaints  Started on vitamin c/multivitamin/MDI  Airborne/contact/droplet isolation status  Full code  Main complaints are urinary symptoms leukocytosis possibly related to UTI.   Found to be retaining urine in ED of 900cc. Metzger placed.-pending UA to start antibiotics if UTI  Will give lasix as chest x-ray with mild interstitial disease and elevated BNP with care everywhere history of HFpEF
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
## 2022-07-19 NOTE — PLAN OF CARE
Problem: Occupational Therapy  Goal: Occupational Therapy Goal  Description: Goals to be met by: 8/2/22    Patient will increase functional independence with ADLs by performing:    Feeding with Supervision.  UE Dressing with Supervision.  LE Dressing with Supervision.  Grooming while seated with Supervision.  Toileting from bedside commode with Supervision for hygiene and clothing management.   Supine to sit with Supervision.  Step transfer with Supervision  Toilet transfer to bedside commode with Supervision.    Outcome: Ongoing, Progressing

## 2022-07-19 NOTE — PLAN OF CARE
07/19/22 0838   Discharge Planning   Assessment Type Discharge Planning Brief Assessment   Resource/Environmental Concerns none   Support Systems Children;Family members   Current Living Arrangements home/apartment/condo   Patient/Family Anticipates Transition to home with family   Patient/Family Anticipated Services at Transition none   DME Needed Upon Discharge  other (see comments)  (TBD)   Discharge Plan A Home with family  (with instructions to follow up post hospital discharge)     [id number] - [place] - [address] AT [facility] [address]  [address]  [place] [address]  Phone: [phone] Fax: [phone]

## 2022-07-22 ENCOUNTER — NURSE TRIAGE (OUTPATIENT)
Dept: ADMINISTRATIVE | Facility: CLINIC | Age: 87
End: 2022-07-22
Payer: MEDICARE

## 2022-07-22 LAB
BACTERIA BLD CULT: NORMAL
BACTERIA BLD CULT: NORMAL

## 2022-07-22 NOTE — DISCHARGE SUMMARY
[facility] Medicine  Discharge Summary      Patient Name: Veronica Campos  MRN: [id number]  Patient Class: OP- Observation  Admission Date: 7/18/2022  Hospital Length of Stay: 0 days  Discharge Date and Time: 7/19/2022  7:48 PM  Attending Physician: No att. providers found   Discharging Provider: Sarah Guzman NP  Primary Care Provider: Primary Doctor Yudi      HPI:   Veronica Campos 89 y.o. female with HTN, HLD, DM, presents to the hospital chief complaint of fall.  Per the daughter she had increasing urinary frequency last night, and today was found after a fall while sitting on the toilet.  She was then helped up and down the weight back to the house she had another fall.  The daughter denies this pain loss of consciousness.  Patient reports she feels generally weak.  She also complains of feeling that she needs to urinate but is unable to urinate.  There have been no complaints of fevers shortness of breath vomiting dizziness melena hematuria slurred speech numbness or weakness of an extremity.  Per the daughter she is mostly independent in her ADLs.  She is normally ambulatory at baseline.    In the ED, found to be COVID positive, O2 saturation is 95%, , troponin 0.06, lactic acid 1 point CT scan abdomen pelvis without contrast without acute abnormality CT head without acute abnormality, chest x-ray with nonspecific interstitial change, creatinine 1.1, white blood cell count 14.     Attempted to use  via language line Rosey ID# [id number] and Raina ID# [id number]. Patient unable to use  due to baseline difficulty hearing per daughter. Daughter acts as       * No surgery found *      Hospital Course:   Patient monitored closely during observation stay. She was found to covid and remained asymptomatic. Troponin slightly elevated. Discussed with cardiology and does not recommend ischemic eval. Patient at baseline status. PT/OT consulted. She is medically  stable for DC.         Goals of Care Treatment Preferences:  Code Status: Full Code      Consults:     No new Assessment & Plan notes have been filed under this hospital service since the last note was generated.  Service: Hospital Medicine    Final Active Diagnoses:    Diagnosis Date Noted POA    PRINCIPAL PROBLEM:  Syncope [R55] 07/18/2022 Yes    COVID-19 virus infection [U07.1] 07/18/2022 Yes    Type 2 diabetes mellitus, controlled [E11.9] 12/27/2013 Yes    Combined hyperlipidemia associated with type 2 diabetes mellitus [E11.69, E78.2] 01/24/2013 Yes    Hypertension associated with diabetes [E11.59, I15.2] 01/24/2013 Yes      Problems Resolved During this Admission:       Discharged Condition: stable    Disposition: Home or Self Care    Follow Up:   Follow-up Information     Primary Doctor No Follow up in 1 week(s).                     Patient Instructions:      Diet Cardiac     Notify your health care provider if you experience any of the following:  redness, tenderness, or signs of infection (pain, swelling, redness, odor or green/yellow discharge around incision site)     Notify your health care provider if you experience any of the following:  persistent dizziness, light-headedness, or visual disturbances     Notify your health care provider if you experience any of the following:  increased confusion or weakness     Activity as tolerated       Significant Diagnostic Studies: Labs: BMP: No results for input(s): GLU, NA, K, CL, CO2, BUN, CREATININE, CALCIUM, MG in the last 48 hours. and CMP No results for input(s): NA, K, CL, CO2, GLU, BUN, CREATININE, CALCIUM, PROT, ALBUMIN, BILITOT, ALKPHOS, AST, ALT, ANIONGAP, ESTGFRAFRICA, EGFRNONAA in the last 48 hours.    Pending Diagnostic Studies:     Procedure Component Value Units Date/Time    PT/INR [[id number]] Collected: 07/18/22 2147    Order Status: Sent Lab Status: In process Updated: 07/18/22 2148    Specimen: Blood     PTT [[id number]] Collected: 07/18/22  2147    Order Status: Sent Lab Status: In process Updated: 07/18/22 2148    Specimen: Blood          Medications:  Reconciled Home Medications:      Medication List      START taking these medications    aspirin 81 MG EC tablet  Commonly known as: ECOTRIN  Take 1 tablet (81 mg total) by mouth once daily.        CONTINUE taking these medications    atorvastatin 10 MG tablet  Commonly known as: LIPITOR  Take 1 tablet (10 mg total) by mouth once daily.     blood sugar diagnostic Strp  1 strip by Misc.(Non-Drug; Combo Route) route once daily.     blood-glucose meter kit  Commonly known as: BLOOD GLUCOSE MONITOR SYSTEM  Use as instructed     clotrimazole-betamethasone 1-0.05% cream  Commonly known as: LOTRISONE  APPLY TOPICALLY TWICE DAILY AS NEEDED     fenofibrate 160 MG Tab  Take 1 tablet (160 mg total) by mouth once daily.     JANUVIA 50 MG Tab  Generic drug: SITagliptin  TAKE 1 TABLET BY MOUTH EVERY DAY     lancets Misc  1 Stick by Misc.(Non-Drug; Combo Route) route once daily.     metFORMIN 500 mg 24hr tablet  Commonly known as: FORTAMET  3 tabs PO daily with the evening meal     NIFEdipine 60 MG Tbsr  Commonly known as: ADALAT CC  Take 1 tablet (60 mg total) by mouth once daily.     ranitidine 300 MG tablet  Commonly known as: ZANTAC  Take 1 tablet (300 mg total) by mouth once daily.            Indwelling Lines/Drains at time of discharge:   Lines/Drains/Airways     None                 Time spent on the discharge of patient: 35 minutes    ELIJAH Reynoso discussed POC with son. Recommended completing living will.         Sarah Guzman NP  Department of [facility] Medicine  [facility]

## 2022-07-22 NOTE — TELEPHONE ENCOUNTER
In response to the covid symptom monitoring program triage nurse called pt back. Cg that answered stated pt and her guardian were both sleep and would call back when they wake up. Cg also could not verify pts  and did not spell her first name. He said pt is his mom.     Reason for Disposition   Information only question and nurse able to answer    Protocols used: NO PROTOCOL AVAILABLE - INFORMATION ONLY-A-[place]

## 2022-07-22 NOTE — NURSING
Patient discharge information given to patient siena Kidd prior to discharge. States understanding of information provided. IV discontinued tolerated well.All personal belongings with patient prior to discharge, Transported to personal vehicle via hospital transport wheelchair

## 2022-07-22 NOTE — HOSPITAL COURSE
Patient monitored closely during observation stay. She was found to covid and remained asymptomatic. Troponin slightly elevated. Discussed with cardiology and does not recommend ischemic eval. Patient at baseline status. PT/OT consulted. She is medically stable for DC.

## 2022-11-01 ENCOUNTER — HOSPITAL ENCOUNTER (EMERGENCY)
Facility: HOSPITAL | Age: 87
Discharge: HOME | End: 2022-11-01
Attending: EMERGENCY MEDICINE
Payer: MEDICARE

## 2022-11-01 VITALS
RESPIRATION RATE: 18 BRPM | BODY MASS INDEX: 21.26 KG/M2 | HEART RATE: 89 BPM | DIASTOLIC BLOOD PRESSURE: 61 MMHG | SYSTOLIC BLOOD PRESSURE: 135 MMHG | WEIGHT: 120 LBS | OXYGEN SATURATION: 97 % | TEMPERATURE: 98 F

## 2022-11-01 DIAGNOSIS — R53.1 GENERALIZED WEAKNESS: ICD-10-CM

## 2022-11-01 DIAGNOSIS — R13.19 ESOPHAGEAL DYSPHAGIA: Primary | ICD-10-CM

## 2022-11-01 LAB
ALBUMIN SERPL BCP-MCNC: 3.3 G/DL (ref 3.5–5.2)
ALP SERPL-CCNC: 55 U/L (ref 55–135)
ALT SERPL W/O P-5'-P-CCNC: 14 U/L (ref 10–44)
ANION GAP SERPL CALC-SCNC: 9 MMOL/L (ref 8–16)
AST SERPL-CCNC: 18 U/L (ref 10–40)
BASOPHILS # BLD AUTO: 0.05 K/UL (ref 0–0.2)
BASOPHILS NFR BLD: 0.5 % (ref 0–1.9)
BILIRUB SERPL-MCNC: 0.4 MG/DL (ref 0.1–1)
BILIRUB UR QL STRIP: NEGATIVE
BUN SERPL-MCNC: 35 MG/DL (ref 10–30)
CALCIUM SERPL-MCNC: 9.7 MG/DL (ref 8.7–10.5)
CHLORIDE SERPL-SCNC: 106 MMOL/L (ref 95–110)
CLARITY UR: CLEAR
CO2 SERPL-SCNC: 26 MMOL/L (ref 23–29)
COLOR UR: YELLOW
CREAT SERPL-MCNC: 1.1 MG/DL (ref 0.5–1.4)
DIFFERENTIAL METHOD: ABNORMAL
EOSINOPHIL # BLD AUTO: 0.3 K/UL (ref 0–0.5)
EOSINOPHIL NFR BLD: 3.4 % (ref 0–8)
ERYTHROCYTE [DISTWIDTH] IN BLOOD BY AUTOMATED COUNT: 13.9 % (ref 11.5–14.5)
EST. GFR  (NO RACE VARIABLE): 47 ML/MIN/1.73 M^2
GLUCOSE SERPL-MCNC: 103 MG/DL (ref 70–110)
GLUCOSE UR QL STRIP: NEGATIVE
HCT VFR BLD AUTO: 35 % (ref 37–48.5)
HGB BLD-MCNC: 11.4 G/DL (ref 12–16)
HGB UR QL STRIP: NEGATIVE
IMM GRANULOCYTES # BLD AUTO: 0.02 K/UL (ref 0–0.04)
IMM GRANULOCYTES NFR BLD AUTO: 0.2 % (ref 0–0.5)
KETONES UR QL STRIP: NEGATIVE
LEUKOCYTE ESTERASE UR QL STRIP: NEGATIVE
LYMPHOCYTES # BLD AUTO: 2.4 K/UL (ref 1–4.8)
LYMPHOCYTES NFR BLD: 24.7 % (ref 18–48)
MAGNESIUM SERPL-MCNC: 2.3 MG/DL (ref 1.6–2.6)
MCH RBC QN AUTO: 27.8 PG (ref 27–31)
MCHC RBC AUTO-ENTMCNC: 32.6 G/DL (ref 32–36)
MCV RBC AUTO: 85 FL (ref 82–98)
MONOCYTES # BLD AUTO: 0.8 K/UL (ref 0.3–1)
MONOCYTES NFR BLD: 7.9 % (ref 4–15)
NEUTROPHILS # BLD AUTO: 6.1 K/UL (ref 1.8–7.7)
NEUTROPHILS NFR BLD: 63.3 % (ref 38–73)
NITRITE UR QL STRIP: NEGATIVE
NRBC BLD-RTO: 0 /100 WBC
PH UR STRIP: 8 [PH] (ref 5–8)
PLATELET # BLD AUTO: 217 K/UL (ref 150–450)
PMV BLD AUTO: 12.2 FL (ref 9.2–12.9)
POTASSIUM SERPL-SCNC: 4.5 MMOL/L (ref 3.5–5.1)
PROT SERPL-MCNC: 7.7 G/DL (ref 6–8.4)
PROT UR QL STRIP: NEGATIVE
RBC # BLD AUTO: 4.1 M/UL (ref 4–5.4)
SODIUM SERPL-SCNC: 141 MMOL/L (ref 136–145)
SP GR UR STRIP: 1.01 (ref 1–1.03)
URN SPEC COLLECT METH UR: NORMAL
UROBILINOGEN UR STRIP-ACNC: NEGATIVE EU/DL
WBC # BLD AUTO: 9.6 K/UL (ref 3.9–12.7)

## 2022-11-01 PROCEDURE — 80053 COMPREHEN METABOLIC PANEL: CPT | Performed by: EMERGENCY MEDICINE

## 2022-11-01 PROCEDURE — 93010 ELECTROCARDIOGRAM REPORT: CPT | Mod: ,,, | Performed by: INTERNAL MEDICINE

## 2022-11-01 PROCEDURE — 93010 EKG 12-LEAD: ICD-10-PCS | Mod: ,,, | Performed by: INTERNAL MEDICINE

## 2022-11-01 PROCEDURE — 96360 HYDRATION IV INFUSION INIT: CPT

## 2022-11-01 PROCEDURE — 93005 ELECTROCARDIOGRAM TRACING: CPT

## 2022-11-01 PROCEDURE — 25000003 PHARM REV CODE 250: Performed by: EMERGENCY MEDICINE

## 2022-11-01 PROCEDURE — 81003 URINALYSIS AUTO W/O SCOPE: CPT | Performed by: EMERGENCY MEDICINE

## 2022-11-01 PROCEDURE — 99285 EMERGENCY DEPT VISIT HI MDM: CPT | Mod: 25

## 2022-11-01 PROCEDURE — 85025 COMPLETE CBC W/AUTO DIFF WBC: CPT | Performed by: EMERGENCY MEDICINE

## 2022-11-01 PROCEDURE — 83735 ASSAY OF MAGNESIUM: CPT | Performed by: EMERGENCY MEDICINE

## 2022-11-01 RX ADMIN — SODIUM CHLORIDE 500 ML: 0.9 INJECTION, SOLUTION INTRAVENOUS at 04:11
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
## 2022-11-01 NOTE — DISCHARGE INSTRUCTIONS
Please return immediately if you get worse or if new problems develop.  Please follow-up with the gastroenterologist above.  You may also follow-up with your primary care doctor, or the primary care doctor above.  Lots of liquids.  Rest.

## 2022-11-01 NOTE — ED NOTES
Patient currently has no c/o currently, EMS called to house d/t patient calling family and stating that she had a dream that she was going to die, and she so she called her children to tell them to come to her house to say goodbye d/t her dream. When children got to her home patient c/o abdominal pain, family aslo stated to Ems that her appetite has decreased recently.

## 2024-03-12 ENCOUNTER — HOSPITAL ENCOUNTER (INPATIENT)
Facility: HOSPITAL | Age: 89
LOS: 2 days | Discharge: HOME HEALTH | DRG: 189 | End: 2024-03-14
Attending: EMERGENCY MEDICINE | Admitting: STUDENT IN AN ORGANIZED HEALTH CARE EDUCATION/TRAINING PROGRAM
Payer: MEDICARE

## 2024-03-12 DIAGNOSIS — J81.0 ACUTE PULMONARY EDEMA: ICD-10-CM

## 2024-03-12 DIAGNOSIS — I50.9 CHF (CONGESTIVE HEART FAILURE): ICD-10-CM

## 2024-03-12 DIAGNOSIS — J96.90 RESPIRATORY FAILURE, UNSPECIFIED CHRONICITY, UNSPECIFIED WHETHER WITH HYPOXIA OR HYPERCAPNIA: Primary | ICD-10-CM

## 2024-03-12 DIAGNOSIS — J90 PLEURAL EFFUSION: ICD-10-CM

## 2024-03-12 DIAGNOSIS — R06.02 SHORTNESS OF BREATH: ICD-10-CM

## 2024-03-12 PROBLEM — J96.01 ACUTE HYPOXEMIC RESPIRATORY FAILURE: Status: ACTIVE | Noted: 2024-03-12

## 2024-03-12 LAB
ALBUMIN SERPL BCP-MCNC: 3.5 G/DL (ref 3.5–5.2)
ALLENS TEST: ABNORMAL
ALP SERPL-CCNC: 58 U/L (ref 55–135)
ALT SERPL W/O P-5'-P-CCNC: 12 U/L (ref 10–44)
ANION GAP SERPL CALC-SCNC: 10 MMOL/L (ref 8–16)
AST SERPL-CCNC: 19 U/L (ref 10–40)
BASOPHILS # BLD AUTO: 0.1 K/UL (ref 0–0.2)
BASOPHILS NFR BLD: 1 % (ref 0–1.9)
BILIRUB SERPL-MCNC: 0.6 MG/DL (ref 0.1–1)
BILIRUB UR QL STRIP: NEGATIVE
BNP SERPL-MCNC: 452 PG/ML (ref 0–99)
BUN SERPL-MCNC: 34 MG/DL (ref 10–30)
CALCIUM SERPL-MCNC: 9.2 MG/DL (ref 8.7–10.5)
CHLORIDE SERPL-SCNC: 110 MMOL/L (ref 95–110)
CLARITY UR: CLEAR
CO2 SERPL-SCNC: 22 MMOL/L (ref 23–29)
COLOR UR: YELLOW
CREAT SERPL-MCNC: 1.2 MG/DL (ref 0.5–1.4)
CTP QC/QA: YES
CTP QC/QA: YES
DELSYS: ABNORMAL
DIFFERENTIAL METHOD BLD: ABNORMAL
EOSINOPHIL # BLD AUTO: 0.5 K/UL (ref 0–0.5)
EOSINOPHIL NFR BLD: 4.8 % (ref 0–8)
EP: 5
ERYTHROCYTE [DISTWIDTH] IN BLOOD BY AUTOMATED COUNT: 15.9 % (ref 11.5–14.5)
ERYTHROCYTE [SEDIMENTATION RATE] IN BLOOD BY WESTERGREN METHOD: 20 MM/H
EST. GFR  (NO RACE VARIABLE): 42 ML/MIN/1.73 M^2
FIO2: 40
GLUCOSE SERPL-MCNC: 148 MG/DL (ref 70–110)
GLUCOSE UR QL STRIP: NEGATIVE
HCO3 UR-SCNC: 23.1 MMOL/L (ref 24–28)
HCT VFR BLD AUTO: 33.7 % (ref 37–48.5)
HGB BLD-MCNC: 10.6 G/DL (ref 12–16)
HGB UR QL STRIP: NEGATIVE
IMM GRANULOCYTES # BLD AUTO: 0.05 K/UL (ref 0–0.04)
IMM GRANULOCYTES NFR BLD AUTO: 0.5 % (ref 0–0.5)
IP: 10
KETONES UR QL STRIP: NEGATIVE
LEUKOCYTE ESTERASE UR QL STRIP: NEGATIVE
LYMPHOCYTES # BLD AUTO: 1.4 K/UL (ref 1–4.8)
LYMPHOCYTES NFR BLD: 13.4 % (ref 18–48)
MAGNESIUM SERPL-MCNC: 2.6 MG/DL (ref 1.6–2.6)
MCH RBC QN AUTO: 29.4 PG (ref 27–31)
MCHC RBC AUTO-ENTMCNC: 31.5 G/DL (ref 32–36)
MCV RBC AUTO: 93 FL (ref 82–98)
MIN VOL: 9.6
MODE: ABNORMAL
MONOCYTES # BLD AUTO: 0.6 K/UL (ref 0.3–1)
MONOCYTES NFR BLD: 6.2 % (ref 4–15)
NEUTROPHILS # BLD AUTO: 7.6 K/UL (ref 1.8–7.7)
NEUTROPHILS NFR BLD: 74.1 % (ref 38–73)
NITRITE UR QL STRIP: NEGATIVE
NRBC BLD-RTO: 0 /100 WBC
PCO2 BLDA: 40.8 MMHG (ref 35–45)
PH SMN: 7.36 [PH] (ref 7.35–7.45)
PH UR STRIP: 6 [PH] (ref 5–8)
PLATELET # BLD AUTO: 238 K/UL (ref 150–450)
PMV BLD AUTO: 10.5 FL (ref 9.2–12.9)
PO2 BLDA: 81 MMHG (ref 80–100)
POC BE: -2 MMOL/L
POC MOLECULAR INFLUENZA A AGN: NEGATIVE
POC MOLECULAR INFLUENZA B AGN: NEGATIVE
POC SATURATED O2: 95 % (ref 95–100)
POC TCO2: 24 MMOL/L (ref 23–27)
POCT GLUCOSE: 182 MG/DL (ref 70–110)
POCT GLUCOSE: 211 MG/DL (ref 70–110)
POTASSIUM SERPL-SCNC: 4.4 MMOL/L (ref 3.5–5.1)
PROT SERPL-MCNC: 7.7 G/DL (ref 6–8.4)
PROT UR QL STRIP: NEGATIVE
RBC # BLD AUTO: 3.61 M/UL (ref 4–5.4)
SAMPLE: ABNORMAL
SARS-COV-2 RDRP RESP QL NAA+PROBE: NEGATIVE
SITE: ABNORMAL
SODIUM SERPL-SCNC: 142 MMOL/L (ref 136–145)
SP GR UR STRIP: 1.01 (ref 1–1.03)
SP02: 98
SPONT RATE: 28
TROPONIN I SERPL DL<=0.01 NG/ML-MCNC: <0.006 NG/ML (ref 0–0.03)
URN SPEC COLLECT METH UR: NORMAL
UROBILINOGEN UR STRIP-ACNC: NEGATIVE EU/DL
WBC # BLD AUTO: 10.21 K/UL (ref 3.9–12.7)

## 2024-03-12 PROCEDURE — 94660 CPAP INITIATION&MGMT: CPT

## 2024-03-12 PROCEDURE — 5A09357 ASSISTANCE WITH RESPIRATORY VENTILATION, LESS THAN 24 CONSECUTIVE HOURS, CONTINUOUS POSITIVE AIRWAY PRESSURE: ICD-10-PCS | Performed by: HOSPITALIST

## 2024-03-12 PROCEDURE — 63600175 PHARM REV CODE 636 W HCPCS: Performed by: STUDENT IN AN ORGANIZED HEALTH CARE EDUCATION/TRAINING PROGRAM

## 2024-03-12 PROCEDURE — 93010 ELECTROCARDIOGRAM REPORT: CPT | Mod: ,,, | Performed by: INTERNAL MEDICINE

## 2024-03-12 PROCEDURE — 25000242 PHARM REV CODE 250 ALT 637 W/ HCPCS: Performed by: EMERGENCY MEDICINE

## 2024-03-12 PROCEDURE — 99285 EMERGENCY DEPT VISIT HI MDM: CPT | Mod: 25

## 2024-03-12 PROCEDURE — 81003 URINALYSIS AUTO W/O SCOPE: CPT | Performed by: EMERGENCY MEDICINE

## 2024-03-12 PROCEDURE — 93005 ELECTROCARDIOGRAM TRACING: CPT

## 2024-03-12 PROCEDURE — 85025 COMPLETE CBC W/AUTO DIFF WBC: CPT | Performed by: EMERGENCY MEDICINE

## 2024-03-12 PROCEDURE — 83735 ASSAY OF MAGNESIUM: CPT | Performed by: EMERGENCY MEDICINE

## 2024-03-12 PROCEDURE — 83880 ASSAY OF NATRIURETIC PEPTIDE: CPT | Performed by: EMERGENCY MEDICINE

## 2024-03-12 PROCEDURE — 94761 N-INVAS EAR/PLS OXIMETRY MLT: CPT | Mod: XB

## 2024-03-12 PROCEDURE — 96374 THER/PROPH/DIAG INJ IV PUSH: CPT

## 2024-03-12 PROCEDURE — 63600175 PHARM REV CODE 636 W HCPCS: Performed by: EMERGENCY MEDICINE

## 2024-03-12 PROCEDURE — 82803 BLOOD GASES ANY COMBINATION: CPT

## 2024-03-12 PROCEDURE — 94644 CONT INHLJ TX 1ST HOUR: CPT

## 2024-03-12 PROCEDURE — 83036 HEMOGLOBIN GLYCOSYLATED A1C: CPT | Performed by: STUDENT IN AN ORGANIZED HEALTH CARE EDUCATION/TRAINING PROGRAM

## 2024-03-12 PROCEDURE — 27000221 HC OXYGEN, UP TO 24 HOURS

## 2024-03-12 PROCEDURE — 27000190 HC CPAP FULL FACE MASK W/VALVE

## 2024-03-12 PROCEDURE — 21400001 HC TELEMETRY ROOM

## 2024-03-12 PROCEDURE — 80053 COMPREHEN METABOLIC PANEL: CPT | Performed by: EMERGENCY MEDICINE

## 2024-03-12 PROCEDURE — 84484 ASSAY OF TROPONIN QUANT: CPT | Performed by: EMERGENCY MEDICINE

## 2024-03-12 PROCEDURE — 36600 WITHDRAWAL OF ARTERIAL BLOOD: CPT

## 2024-03-12 PROCEDURE — 99900035 HC TECH TIME PER 15 MIN (STAT)

## 2024-03-12 PROCEDURE — 87635 SARS-COV-2 COVID-19 AMP PRB: CPT | Performed by: EMERGENCY MEDICINE

## 2024-03-12 RX ORDER — FUROSEMIDE 10 MG/ML
40 INJECTION INTRAMUSCULAR; INTRAVENOUS EVERY 12 HOURS
Status: DISCONTINUED | OUTPATIENT
Start: 2024-03-12 | End: 2024-03-13

## 2024-03-12 RX ORDER — FUROSEMIDE 10 MG/ML
40 INJECTION INTRAMUSCULAR; INTRAVENOUS
Status: COMPLETED | OUTPATIENT
Start: 2024-03-12 | End: 2024-03-12

## 2024-03-12 RX ORDER — ASPIRIN 81 MG/1
81 TABLET ORAL DAILY
Status: DISCONTINUED | OUTPATIENT
Start: 2024-03-12 | End: 2024-03-14 | Stop reason: HOSPADM

## 2024-03-12 RX ORDER — INSULIN ASPART 100 [IU]/ML
0-5 INJECTION, SOLUTION INTRAVENOUS; SUBCUTANEOUS
Status: DISCONTINUED | OUTPATIENT
Start: 2024-03-12 | End: 2024-03-14 | Stop reason: HOSPADM

## 2024-03-12 RX ORDER — SODIUM CHLORIDE 0.9 % (FLUSH) 0.9 %
10 SYRINGE (ML) INJECTION
Status: DISCONTINUED | OUTPATIENT
Start: 2024-03-12 | End: 2024-03-14 | Stop reason: HOSPADM

## 2024-03-12 RX ORDER — IBUPROFEN 200 MG
24 TABLET ORAL
Status: DISCONTINUED | OUTPATIENT
Start: 2024-03-12 | End: 2024-03-14 | Stop reason: HOSPADM

## 2024-03-12 RX ORDER — LANCETS 30 GAUGE
EACH MISCELLANEOUS 2 TIMES DAILY
COMMUNITY
Start: 2024-02-19

## 2024-03-12 RX ORDER — IPRATROPIUM BROMIDE AND ALBUTEROL SULFATE 2.5; .5 MG/3ML; MG/3ML
3 SOLUTION RESPIRATORY (INHALATION)
Status: COMPLETED | OUTPATIENT
Start: 2024-03-12 | End: 2024-03-12

## 2024-03-12 RX ORDER — GLUCAGON 1 MG
1 KIT INJECTION
Status: DISCONTINUED | OUTPATIENT
Start: 2024-03-12 | End: 2024-03-14 | Stop reason: HOSPADM

## 2024-03-12 RX ORDER — ATORVASTATIN CALCIUM 10 MG/1
10 TABLET, FILM COATED ORAL DAILY
Status: DISCONTINUED | OUTPATIENT
Start: 2024-03-12 | End: 2024-03-14 | Stop reason: HOSPADM

## 2024-03-12 RX ORDER — IBUPROFEN 200 MG
16 TABLET ORAL
Status: DISCONTINUED | OUTPATIENT
Start: 2024-03-12 | End: 2024-03-14 | Stop reason: HOSPADM

## 2024-03-12 RX ADMIN — FUROSEMIDE 40 MG: 10 INJECTION, SOLUTION INTRAVENOUS at 01:03

## 2024-03-12 RX ADMIN — IPRATROPIUM BROMIDE AND ALBUTEROL SULFATE 3 ML: 2.5; .5 SOLUTION RESPIRATORY (INHALATION) at 11:03

## 2024-03-12 RX ADMIN — FUROSEMIDE 40 MG: 10 INJECTION, SOLUTION INTRAVENOUS at 08:03

## 2024-03-12 RX ADMIN — IPRATROPIUM BROMIDE AND ALBUTEROL SULFATE 3 ML: 2.5; .5 SOLUTION RESPIRATORY (INHALATION) at 12:03

## 2024-03-12 NOTE — ADMISSIONCARE
AdmissionCare    Guideline: Respiratory Failure, Inpatient    Based on the indications selected for the patient, the bed status of Inpatient was determined to be MET    The following indications were selected as present at the time of evaluation of the patient:      - New (acute) need for mechanical invasive or noninvasive (eg, bilevel positive airway pressure (BPAP), volume-assured pressure support (VAPS), or volume control) ventilation    AdmissionCare documentation entered by: Hilaria Manjarrez    [facility] [employer], 27th edition, Copyright © 2023 [facility] [employer], [facility] All Rights Reserved.  [id number]:00
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
## 2024-03-12 NOTE — ASSESSMENT & PLAN NOTE
"Patient's FSGs are controlled on current medication regimen.  Last A1c reviewed-   Lab Results   Component Value Date    HGBA1C 6.7 (H) 07/18/2022     Most recent fingerstick glucose reviewed- No results for input(s): "POCTGLUCOSE" in the last 24 hours.  Current correctional scale  Low  Maintain anti-hyperglycemic dose as follows-   Antihyperglycemics (From admission, onward)      Start     Stop Route Frequency Ordered    03/12/24 1611  insulin aspart U-100 pen 0-5 Units         -- SubQ Before meals & nightly PRN 03/12/24 1511          Hold Oral hypoglycemics while patient is in the hospital.  "

## 2024-03-12 NOTE — PLAN OF CARE
SW attempted contacting patient's son, Bernabe Campos, via telephone for assistance completing pt discharge planning assessment.  There was no answer and the voice mailbox was full.  :  Shailesh ID#[id number]

## 2024-03-12 NOTE — ASSESSMENT & PLAN NOTE
Patient with Hypoxic Respiratory failure which is Acute.  she is not on home oxygen. Supplemental oxygen was provided and noted- Oxygen Concentration (%):  [40] 40    .   Signs/symptoms of respiratory failure include- tachypnea, increased work of breathing, and respiratory distress. Contributing diagnoses includes - CHF Labs and images were reviewed. Patient Has recent ABG, which has been reviewed. Will treat underlying causes and adjust management of respiratory failure as follows-   -Appear to be acute onset chf in setting of elevated bnp, cxr with signs of fluid, recent ble swelling and hypoxia.   -transitioned off of bipap with improved oxygenation now on NC with adequate o2 saturations and comfortable breathing.     -continue to wean o2 as tolerated  -continue chf pathway with iv lasix  -echo pending

## 2024-03-12 NOTE — ED NOTES
Pt was becoming increasingly agitated due to having to urinate.  She refused to use the purewik.  MD notified of agitation.  Verbal order received for rodríguez to be placed.

## 2024-03-12 NOTE — ASSESSMENT & PLAN NOTE
Patient is identified as having  unknown  heart failure that is Acute. CHF is currently uncontrolled due to Rales/crackles on pulmonary exam and Pulmonary edema/pleural effusion on CXR. Latest ECHO performed and demonstrates- Results for orders placed during the hospital encounter of 07/18/22    Echo    Interpretation Summary  · The left ventricle is normal in size with mild concentric hypertrophy and normal systolic function.  · The estimated ejection fraction is 70%.  · Grade II left ventricular diastolic dysfunction.  · Normal right ventricular size with normal right ventricular systolic function.  · There is moderate-to-severe aortic valve stenosis.  · Aortic valve area is 0.96 cm2; peak velocity is 3.20 m/s; mean gradient is 23 mmHg.  · Mild aortic regurgitation.  · Mild-to-moderate mitral regurgitation.  · Mild to moderate tricuspid regurgitation.  · The estimated PA systolic pressure is 58 mmHg.  · There is pulmonary hypertension.  . Continue Furosemide and monitor clinical status closely. Monitor on telemetry. Patient is on CHF pathway.  Monitor strict Is&Os and daily weights.  Place on fluid restriction of 1.5 L. Cardiology has not been consulted. Continue to stress to patient importance of self efficacy and  on diet for CHF. Last BNP reviewed- and noted below   Recent Labs   Lab 03/12/24  1113   *     -continue diuresis

## 2024-03-12 NOTE — ED NOTES
Pt brought in by EMS, Pt placed on 4 LNC. Pt desated to 88% and started to c/o of trouble breathing.  Immediately placed pt on NRB, Sats increased to 96%. Dr. Chacko notified of need to see pt now.
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
## 2024-03-12 NOTE — Clinical Note
Diagnosis: Respiratory failure, unspecified chronicity, unspecified whether with hypoxia or hypercapnia [[id number]]   Future Attending Provider: ERICH PEREIRA III [[id number]]   Reason for IP Medical Treatment  (Clinical interventions that can only be accomplished in the IP setting? ) :: respi failure   I certify that Inpatient services for greater than or equal to 2 midnights are medically necessary:: Yes   Plans for Post-Acute care--if anticipated (pick the single best option):: A. No post acute care anticipated at this time

## 2024-03-12 NOTE — HPI
89F with pmh of CKD, T2DM,  HLD, HTN, presents to the ED for worsening shortness of breath that started this morning. Daughter at bedside states she will translate. According to daughter pt at baseline mostly stays in bed, but able to get up and walk to the bathroom on her own. Over the past couple of days she has been much less active at home and has been c/o worsening sob especially with exertion. Pts daughter also noted bilateral le swelling that started about the same time and was new for the pt. No known sick contacts. Pt without fever, chills, n/v, abd pain, cp, cough. Pt initially on bipap, but able to transition to NC stating she is feeling much better with her breathing now.

## 2024-03-13 PROBLEM — I50.31 ACUTE DIASTOLIC CONGESTIVE HEART FAILURE: Status: ACTIVE | Noted: 2024-03-12

## 2024-03-13 PROBLEM — N17.9 AKI (ACUTE KIDNEY INJURY): Status: ACTIVE | Noted: 2024-03-13

## 2024-03-13 PROBLEM — E43 SEVERE PROTEIN-CALORIE MALNUTRITION: Status: ACTIVE | Noted: 2024-03-13

## 2024-03-13 PROBLEM — I35.0 AORTIC STENOSIS: Status: ACTIVE | Noted: 2024-03-13

## 2024-03-13 PROBLEM — Z71.89 ADVANCE CARE PLANNING: Status: ACTIVE | Noted: 2024-03-13

## 2024-03-13 LAB
ANION GAP SERPL CALC-SCNC: 15 MMOL/L (ref 8–16)
ASCENDING AORTA: 3.06 CM
AV INDEX (PROSTH): 0.29
AV MEAN GRADIENT: 36 MMHG
AV PEAK GRADIENT: 66 MMHG
AV VALVE AREA BY VELOCITY RATIO: 0.83 CM²
AV VALVE AREA: 0.89 CM²
AV VELOCITY RATIO: 0.27
BSA FOR ECHO PROCEDURE: 1.49 M2
BUN SERPL-MCNC: 34 MG/DL (ref 10–30)
CALCIUM SERPL-MCNC: 9.1 MG/DL (ref 8.7–10.5)
CHLORIDE SERPL-SCNC: 104 MMOL/L (ref 95–110)
CO2 SERPL-SCNC: 25 MMOL/L (ref 23–29)
CREAT SERPL-MCNC: 1.5 MG/DL (ref 0.5–1.4)
CV ECHO LV RWT: 0.5 CM
DOP CALC AO PEAK VEL: 4.05 M/S
DOP CALC AO VTI: 77.9 CM
DOP CALC LVOT AREA: 3 CM2
DOP CALC LVOT DIAMETER: 1.97 CM
DOP CALC LVOT PEAK VEL: 1.1 M/S
DOP CALC LVOT STROKE VOLUME: 69.46 CM3
DOP CALC MV VTI: 54.4 CM
DOP CALCLVOT PEAK VEL VTI: 22.8 CM
E WAVE DECELERATION TIME: 232.34 MSEC
E/A RATIO: 1.54
E/E' RATIO: 47.8 M/S
ECHO LV POSTERIOR WALL: 1.11 CM (ref 0.6–1.1)
EST. GFR  (NO RACE VARIABLE): 32 ML/MIN/1.73 M^2
ESTIMATED AVG GLUCOSE: 108 MG/DL (ref 68–131)
FRACTIONAL SHORTENING: 43 % (ref 28–44)
GLUCOSE SERPL-MCNC: 96 MG/DL (ref 70–110)
HBA1C MFR BLD: 5.4 % (ref 4–5.6)
INTERVENTRICULAR SEPTUM: 1.11 CM (ref 0.6–1.1)
IVC DIAMETER: 1.73 CM
IVRT: 67.08 MSEC
LA MAJOR: 7.19 CM
LA MINOR: 6.85 CM
LA WIDTH: 5.3 CM
LEFT ATRIUM SIZE: 4.73 CM
LEFT ATRIUM VOLUME INDEX: 99.7 ML/M2
LEFT ATRIUM VOLUME: 149.5 CM3
LEFT INTERNAL DIMENSION IN SYSTOLE: 2.53 CM (ref 2.1–4)
LEFT VENTRICLE DIASTOLIC VOLUME INDEX: 60.02 ML/M2
LEFT VENTRICLE DIASTOLIC VOLUME: 90.03 ML
LEFT VENTRICLE MASS INDEX: 116 G/M2
LEFT VENTRICLE SYSTOLIC VOLUME INDEX: 15.3 ML/M2
LEFT VENTRICLE SYSTOLIC VOLUME: 22.93 ML
LEFT VENTRICULAR INTERNAL DIMENSION IN DIASTOLE: 4.45 CM (ref 3.5–6)
LEFT VENTRICULAR MASS: 174.17 G
LV LATERAL E/E' RATIO: 39.83 M/S
LV SEPTAL E/E' RATIO: 59.75 M/S
LVOT MG: 2.76 MMHG
LVOT MV: 0.77 CM/S
MV MEAN GRADIENT: 11 MMHG
MV PEAK A VEL: 1.55 M/S
MV PEAK E VEL: 2.39 M/S
MV PEAK GRADIENT: 26 MMHG
MV STENOSIS PRESSURE HALF TIME: 67.77 MS
MV VALVE AREA BY CONTINUITY EQUATION: 1.28 CM2
MV VALVE AREA P 1/2 METHOD: 3.25 CM2
OHS QRS DURATION: 70 MS
OHS QTC CALCULATION: 437 MS
PISA TR MAX VEL: 5.61 M/S
POCT GLUCOSE: 126 MG/DL (ref 70–110)
POCT GLUCOSE: 133 MG/DL (ref 70–110)
POCT GLUCOSE: 145 MG/DL (ref 70–110)
POTASSIUM SERPL-SCNC: 3.9 MMOL/L (ref 3.5–5.1)
PV PEAK GRADIENT: 7 MMHG
PV PEAK VELOCITY: 1.29 M/S
RA MAJOR: 6.59 CM
RA PRESSURE ESTIMATED: 3 MMHG
RA WIDTH: 4.4 CM
RIGHT VENTRICULAR END-DIASTOLIC DIMENSION: 3.63 CM
RV TB RVSP: 9 MMHG
RV TISSUE DOPPLER FREE WALL SYSTOLIC VELOCITY 1 (APICAL 4 CHAMBER VIEW): 16.44 CM/S
SINUS: 2.86 CM
SODIUM SERPL-SCNC: 144 MMOL/L (ref 136–145)
STJ: 2.09 CM
TDI LATERAL: 0.06 M/S
TDI SEPTAL: 0.04 M/S
TDI: 0.05 M/S
TR MAX PG: 126 MMHG
TRICUSPID ANNULAR PLANE SYSTOLIC EXCURSION: 1.75 CM
TV PEAK GRADIENT: 70 MMHG
TV REST PULMONARY ARTERY PRESSURE: 129 MMHG
Z-SCORE OF LEFT VENTRICULAR DIMENSION IN END DIASTOLE: 0.04
Z-SCORE OF LEFT VENTRICULAR DIMENSION IN END SYSTOLE: -0.66

## 2024-03-13 PROCEDURE — 97165 OT EVAL LOW COMPLEX 30 MIN: CPT

## 2024-03-13 PROCEDURE — 97110 THERAPEUTIC EXERCISES: CPT

## 2024-03-13 PROCEDURE — 94760 N-INVAS EAR/PLS OXIMETRY 1: CPT

## 2024-03-13 PROCEDURE — 25000003 PHARM REV CODE 250: Performed by: HOSPITALIST

## 2024-03-13 PROCEDURE — 99497 ADVNCD CARE PLAN 30 MIN: CPT | Mod: 25,,, | Performed by: INTERNAL MEDICINE

## 2024-03-13 PROCEDURE — 27000221 HC OXYGEN, UP TO 24 HOURS

## 2024-03-13 PROCEDURE — 80048 BASIC METABOLIC PNL TOTAL CA: CPT | Performed by: STUDENT IN AN ORGANIZED HEALTH CARE EDUCATION/TRAINING PROGRAM

## 2024-03-13 PROCEDURE — 97530 THERAPEUTIC ACTIVITIES: CPT

## 2024-03-13 PROCEDURE — 97161 PT EVAL LOW COMPLEX 20 MIN: CPT

## 2024-03-13 PROCEDURE — 21400001 HC TELEMETRY ROOM

## 2024-03-13 PROCEDURE — 36415 COLL VENOUS BLD VENIPUNCTURE: CPT | Performed by: STUDENT IN AN ORGANIZED HEALTH CARE EDUCATION/TRAINING PROGRAM

## 2024-03-13 PROCEDURE — 99223 1ST HOSP IP/OBS HIGH 75: CPT | Mod: ,,, | Performed by: INTERNAL MEDICINE

## 2024-03-13 PROCEDURE — 25000003 PHARM REV CODE 250: Performed by: STUDENT IN AN ORGANIZED HEALTH CARE EDUCATION/TRAINING PROGRAM

## 2024-03-13 RX ORDER — MUPIROCIN 20 MG/G
OINTMENT TOPICAL 2 TIMES DAILY
Status: DISCONTINUED | OUTPATIENT
Start: 2024-03-13 | End: 2024-03-14 | Stop reason: HOSPADM

## 2024-03-13 RX ADMIN — ATORVASTATIN CALCIUM 10 MG: 10 TABLET, FILM COATED ORAL at 08:03

## 2024-03-13 RX ADMIN — MUPIROCIN: 20 OINTMENT TOPICAL at 08:03

## 2024-03-13 RX ADMIN — MUPIROCIN: 20 OINTMENT TOPICAL at 09:03

## 2024-03-13 RX ADMIN — ASPIRIN 81 MG: 81 TABLET, COATED ORAL at 08:03

## 2024-03-13 NOTE — PLAN OF CARE
Problem: Adult Inpatient Plan of Care  Goal: Plan of Care Review  Outcome: Ongoing, Progressing  Goal: Patient-Specific Goal (Individualized)  Outcome: Ongoing, Progressing  Goal: Absence of Hospital-Acquired Illness or Injury  Outcome: Ongoing, Progressing  Goal: Optimal Comfort and Wellbeing  Outcome: Ongoing, Progressing  Goal: Readiness for Transition of Care  Outcome: Ongoing, Progressing     Problem: Coping Ineffective  Goal: Effective Coping  Outcome: Ongoing, Progressing     Problem: Infection  Goal: Absence of Infection Signs and Symptoms  Outcome: Ongoing, Progressing     Problem: Diabetes Comorbidity  Goal: Blood Glucose Level Within Targeted Range  Outcome: Ongoing, Progressing     Problem: Fall Injury Risk  Goal: Absence of Fall and Fall-Related Injury  Outcome: Ongoing, Progressing     Problem: Skin Injury Risk Increased  Goal: Skin Health and Integrity  Outcome: Ongoing, Progressing

## 2024-03-13 NOTE — PROGRESS NOTES
[facility] Medicine  Progress Note    Patient Name: Veronica Campos  MRN: [id number]  Patient Class: IP- Inpatient   Admission Date: 3/12/2024  Length of Stay: 1 days  Attending Physician: Paradise Drake, *  Primary Care Provider: Yudi, Primary Doctor        Subjective:     Principal Problem:Acute hypoxemic respiratory failure        HPI:  89F with pmh of CKD, T2DM,  HLD, HTN, presents to the ED for worsening shortness of breath that started this morning. Daughter at bedside states she will translate. According to daughter pt at baseline mostly stays in bed, but able to get up and walk to the bathroom on her own. Over the past couple of days she has been much less active at home and has been c/o worsening sob especially with exertion. Pts daughter also noted bilateral le swelling that started about the same time and was new for the pt. No known sick contacts. Pt without fever, chills, n/v, abd pain, cp, cough. Pt initially on bipap, but able to transition to NC stating she is feeling much better with her breathing now.     Overview/Hospital Course:  89F with pmh of CKD, T2DM,  HLD, HTN, presents to the ED for worsening shortness of breath that started this morning. Daughter at bedside states she will translate. According to daughter pt at baseline mostly stays in bed, but able to get up and walk to the bathroom on her own. Over the past couple of days she has been much less active at home and has been c/o worsening sob especially with exertion. Pts daughter also noted bilateral le swelling that started about the same time and was new for the pt. No known sick contacts. Pt without fever, chills, n/v, abd pain, cp, cough. Pt initially on bipap, but able to transition to NC stating she is feeling much better with her breathing now.   Hold  lasix for RASHAAD,will monitor,consulted PT,OT.    Interval History: Hold  lasix for RASHAAD,will monitor,consulted PT,OT.    Review of Systems   Constitutional:   Positive for activity change.   Respiratory:  Negative for apnea and choking.    Cardiovascular:  Negative for leg swelling.   Genitourinary:  Negative for difficulty urinating and dyspareunia.   Musculoskeletal:  Negative for arthralgias.   Neurological:  Positive for weakness.     Objective:     Vital Signs (Most Recent):  Temp: 98 °F (36.7 °C) (03/13/24 0826)  Pulse: 79 (03/13/24 0826)  Resp: 18 (03/13/24 0826)  BP: (!) 159/66 (03/13/24 0826)  SpO2: (!) 94 % (03/13/24 0826) Vital Signs (24h Range):  Temp:  [97.2 °F (36.2 °C)-98.4 °F (36.9 °C)] 98 °F (36.7 °C)  Pulse:  [] 79  Resp:  [16-31] 18  SpO2:  [92 %-100 %] 94 %  BP: (128-164)/(60-73) 159/66     Weight: 36.7 kg (80 lb 14.5 oz)  Body mass index is 18.8 kg/m².    Intake/Output Summary (Last 24 hours) at 3/13/2024 1055  Last data filed at 3/13/2024 0427  Gross per 24 hour   Intake 460 ml   Output 2550 ml   Net -2090 ml         Physical Exam  Cardiovascular:      Rate and Rhythm: Normal rate and regular rhythm.   Pulmonary:      Effort: No respiratory distress.      Breath sounds: No rales.   Abdominal:      General: There is no distension.      Tenderness: There is no abdominal tenderness.   Musculoskeletal:         General: No swelling.   Skin:     General: Skin is warm.   Neurological:      Mental Status: She is alert and oriented to person, place, and time.             Significant Labs: All pertinent labs within the past 24 hours have been reviewed.  BMP:   Recent Labs   Lab 03/12/24  1113 03/13/24  0339   * 96    144   K 4.4 3.9    104   CO2 22* 25   BUN 34* 34*   CREATININE 1.2 1.5*   CALCIUM 9.2 9.1   MG 2.6  --      CBC:   Recent Labs   Lab 03/12/24  1113   WBC 10.21   HGB 10.6*   HCT 33.7*        CMP:   Recent Labs   Lab 03/12/24  1113 03/13/24  0339    144   K 4.4 3.9    104   CO2 22* 25   * 96   BUN 34* 34*   CREATININE 1.2 1.5*   CALCIUM 9.2 9.1   PROT 7.7  --    ALBUMIN 3.5  --    BILITOT 0.6  --     ALKPHOS 58  --    AST 19  --    ALT 12  --    ANIONGAP 10 15       Significant Imaging: I have reviewed all pertinent imaging results/findings within the past 24 hours.    Assessment/Plan:      * Acute hypoxemic respiratory failure  Patient with Hypoxic Respiratory failure which is Acute.  she is not on home oxygen. Supplemental oxygen was provided and noted- Oxygen Concentration (%):  [40] 40    .   Signs/symptoms of respiratory failure include- tachypnea, increased work of breathing, and respiratory distress. Contributing diagnoses includes - CHF Labs and images were reviewed. Patient Has recent ABG, which has been reviewed. Will treat underlying causes and adjust management of respiratory failure as follows-   -Appear to be acute onset chf in setting of elevated bnp, cxr with signs of fluid, recent ble swelling and hypoxia.   -transitioned off of bipap with improved oxygenation now on NC with adequate o2 saturations and comfortable breathing.     -continue to wean o2 as tolerated  -continue chf pathway with iv lasix  -echo pending    RASHAAD (acute kidney injury)  Hold lasix for RASHAAD.    Acute diastolic congestive heart failure  Patient is identified as having  unknown  heart failure that is Acute. CHF is currently uncontrolled due to Rales/crackles on pulmonary exam and Pulmonary edema/pleural effusion on CXR. Latest ECHO performed and demonstrates- Results for orders placed during the hospital encounter of 07/18/22    Echo    Interpretation Summary  · The left ventricle is normal in size with mild concentric hypertrophy and normal systolic function.  · The estimated ejection fraction is 70%.  · Grade II left ventricular diastolic dysfunction.  · Normal right ventricular size with normal right ventricular systolic function.  · There is moderate-to-severe aortic valve stenosis.  · Aortic valve area is 0.96 cm2; peak velocity is 3.20 m/s; mean gradient is 23 mmHg.  · Mild aortic regurgitation.  · Mild-to-moderate  "mitral regurgitation.  · Mild to moderate tricuspid regurgitation.  · The estimated PA systolic pressure is 58 mmHg.  · There is pulmonary hypertension.  . Continue Furosemide and monitor clinical status closely. Monitor on telemetry. Patient is on CHF pathway.  Monitor strict Is&Os and daily weights.  Place on fluid restriction of 1.5 L. Cardiology has not been consulted. Continue to stress to patient importance of self efficacy and  on diet for CHF. Last BNP reviewed- and noted below   Recent Labs   Lab 03/12/24  1113   *     Hold lasix for RASHAAD.      Type 2 diabetes mellitus, controlled  Patient's FSGs are controlled on current medication regimen.  Last A1c reviewed-   Lab Results   Component Value Date    HGBA1C 6.7 (H) 07/18/2022     Most recent fingerstick glucose reviewed- No results for input(s): "POCTGLUCOSE" in the last 24 hours.  Current correctional scale  Low  Maintain anti-hyperglycemic dose as follows-   Antihyperglycemics (From admission, onward)      Start     Stop Route Frequency Ordered    03/12/24 1611  insulin aspart U-100 pen 0-5 Units         -- SubQ Before meals & nightly PRN 03/12/24 1511          Hold Oral hypoglycemics while patient is in the hospital.    Combined hyperlipidemia associated with type 2 diabetes mellitus  Continue statin      Hypertension associated with diabetes  Holding bp medications at this time      VTE Risk Mitigation (From admission, onward)           Ordered     IP VTE HIGH RISK PATIENT  Once         03/12/24 1445     Place sequential compression device  Until discontinued         03/12/24 1445                    Discharge Planning   URIEL:      Code Status: Full Code   Is the patient medically ready for discharge?:     Reason for patient still in hospital (select all that apply): Patient trending condition                     Paradise Drake MD  Department of Hospital Medicine   [facility] - Telemetry    "

## 2024-03-13 NOTE — PLAN OF CARE
Problem: Occupational Therapy  Goal: Occupational Therapy Goal  Description: Goals to be met by: 3/27/24     Patient will increase functional independence with ADLs by performing:    LE Dressing with Modified [place].  Grooming while standing at sink with Modified [place].  Toileting from toilet with Modified [place] for hygiene and clothing management.   Step transfer with Modified [place]  Toilet transfer to toilet with Modified [place].  Upper extremity exercise program x15 reps per handout, with independence.  Implementation of PLB and energy conservation strategies into daily routines w/ (I).     Outcome: Ongoing, Progressing
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
## 2024-03-13 NOTE — ASSESSMENT & PLAN NOTE
- Qualifies for hospice care based on this diagnosis alone   - Per son, pt mostly just drinks Boost or Ensure at home and otherwise does not eat much

## 2024-03-13 NOTE — PLAN OF CARE
Problem: Physical Therapy  Goal: Physical Therapy Goal  Description: Goals to be met by: 3/27/24     Patient will increase functional independence with mobility by performin. Supine to sit with Supervision  2. Rolling to Left and Right with Supervision  3. Sit to stand transfer with Supervision using RW  4. Bed to chair transfer with Supervision using Rolling Walker  5. Gait x50 feet with Supervision using Rolling Walker   6. Wheelchair propulsion x100 feet with Supervision using bilateral upper extremities  7. Upper/Lower extremity exercise program 2 sets x15 reps per handout, with supervision    Outcome: Ongoing, Progressing     Pt OOB>chair with min A-CGA using no AD and on 2L O2 NC.

## 2024-03-13 NOTE — ASSESSMENT & PLAN NOTE
Patient is identified as having  unknown  heart failure that is Acute. CHF is currently uncontrolled due to Rales/crackles on pulmonary exam and Pulmonary edema/pleural effusion on CXR. Latest ECHO performed and demonstrates- Results for orders placed during the hospital encounter of 07/18/22    Echo    Interpretation Summary  · The left ventricle is normal in size with mild concentric hypertrophy and normal systolic function.  · The estimated ejection fraction is 70%.  · Grade II left ventricular diastolic dysfunction.  · Normal right ventricular size with normal right ventricular systolic function.  · There is moderate-to-severe aortic valve stenosis.  · Aortic valve area is 0.96 cm2; peak velocity is 3.20 m/s; mean gradient is 23 mmHg.  · Mild aortic regurgitation.  · Mild-to-moderate mitral regurgitation.  · Mild to moderate tricuspid regurgitation.  · The estimated PA systolic pressure is 58 mmHg.  · There is pulmonary hypertension.  . Continue Furosemide and monitor clinical status closely. Monitor on telemetry. Patient is on CHF pathway.  Monitor strict Is&Os and daily weights.  Place on fluid restriction of 1.5 L. Cardiology has not been consulted. Continue to stress to patient importance of self efficacy and  on diet for CHF. Last BNP reviewed- and noted below   Recent Labs   Lab 03/12/24  1113   *     Hold lasix for RASHAAD.

## 2024-03-13 NOTE — CONSULTS
Food & Nutrition  Education    Diet Education: Low Salt Diet - Fluid Restriction    Learners: Veronica Campos      Nutrition Education provided with handouts: Low Salt/ Fluid Restrictive Diet      Comments: RD consult for education fluid restriction low salt diet. Pt family member reports not eating any food. She only drinks glucerna throughout the day. She does not want to eat foods. Her family member says she will not try shakes, soups or any other methods of consuming foods. Education regarding low salt diet not warranted at this time.    Please reconsult as needed    Thanks!     Herlinda Gary, Registration Eligible, Provisional LDN

## 2024-03-13 NOTE — ASSESSMENT & PLAN NOTE
- Management per primary team; volume overload and/or aspiration?   - Currently on NC oxygen, whereas she does not wear this at home
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
## 2024-03-13 NOTE — SUBJECTIVE & OBJECTIVE
Interval History: Hold  lasix for RASHAAD,will monitor,consulted PT,OT.    Review of Systems   Constitutional:  Positive for activity change.   Respiratory:  Negative for apnea and choking.    Cardiovascular:  Negative for leg swelling.   Genitourinary:  Negative for difficulty urinating and dyspareunia.   Musculoskeletal:  Negative for arthralgias.   Neurological:  Positive for weakness.     Objective:     Vital Signs (Most Recent):  Temp: 98 °F (36.7 °C) (03/13/24 0826)  Pulse: 79 (03/13/24 0826)  Resp: 18 (03/13/24 0826)  BP: (!) 159/66 (03/13/24 0826)  SpO2: (!) 94 % (03/13/24 0826) Vital Signs (24h Range):  Temp:  [97.2 °F (36.2 °C)-98.4 °F (36.9 °C)] 98 °F (36.7 °C)  Pulse:  [] 79  Resp:  [16-31] 18  SpO2:  [92 %-100 %] 94 %  BP: (128-164)/(60-73) 159/66     Weight: 36.7 kg (80 lb 14.5 oz)  Body mass index is 18.8 kg/m².    Intake/Output Summary (Last 24 hours) at 3/13/2024 1055  Last data filed at 3/13/2024 0427  Gross per 24 hour   Intake 460 ml   Output 2550 ml   Net -2090 ml         Physical Exam  Cardiovascular:      Rate and Rhythm: Normal rate and regular rhythm.   Pulmonary:      Effort: No respiratory distress.      Breath sounds: No rales.   Abdominal:      General: There is no distension.      Tenderness: There is no abdominal tenderness.   Musculoskeletal:         General: No swelling.   Skin:     General: Skin is warm.   Neurological:      Mental Status: She is alert and oriented to person, place, and time.             Significant Labs: All pertinent labs within the past 24 hours have been reviewed.  BMP:   Recent Labs   Lab 03/12/24  1113 03/13/24  0339   * 96    144   K 4.4 3.9    104   CO2 22* 25   BUN 34* 34*   CREATININE 1.2 1.5*   CALCIUM 9.2 9.1   MG 2.6  --      CBC:   Recent Labs   Lab 03/12/24  1113   WBC 10.21   HGB 10.6*   HCT 33.7*        CMP:   Recent Labs   Lab 03/12/24  1113 03/13/24  0339    144   K 4.4 3.9    104   CO2 22* 25   * 96    BUN 34* 34*   CREATININE 1.2 1.5*   CALCIUM 9.2 9.1   PROT 7.7  --    ALBUMIN 3.5  --    BILITOT 0.6  --    ALKPHOS 58  --    AST 19  --    ALT 12  --    ANIONGAP 10 15       Significant Imaging: I have reviewed all pertinent imaging results/findings within the past 24 hours.

## 2024-03-13 NOTE — PLAN OF CARE
Case Management Assessment     PCP: [facility]    Pharmacy:   [id number] - [place], [place] - [address] AT [facility] [address]  [address] [place] [address]  Phone: [phone] Fax: [phone]        Patient Arrived From: Home  Existing Help at Home: sons    Barriers to Discharge: No barrier to discharge.     Discharge Plan:    A. Home with family   B. Hospice       03/13/24 1401   Discharge Assessment   Assessment Type Discharge Planning Assessment   Confirmed/corrected address, phone number and insurance Yes   Confirmed Demographics Correct on Facesheet   Source of Information health record   Communicated URIEL with patient/caregiver Date not available/Unable to determine   Reason For Admission Acute Hypoxemic Respiratory Failure   People in Home child(chance), adult   Do you expect to return to your current living situation? Yes   Do you have help at home or someone to help you manage your care at home? Yes   Who are your caregiver(s) and their phone number(s)? Bernabe Campos (Son) [phone] (Mobile)   Prior to hospitilization cognitive status: Alert/Oriented   Current cognitive status: Alert/Oriented   Equipment Currently Used at Home none   Readmission within 30 days? No   Patient currently being followed by outpatient case management? No   Do you currently have service(s) that help you manage your care at home? No   Do you take prescription medications? Yes   Do you have prescription coverage? Yes   Coverage [facility] Medicare   Do you have any problems affording any of your prescribed medications? No   Is the patient taking medications as prescribed? yes   Who is going to help you get home at discharge? Bernabe Campos (Son) [phone] (Mobile) and son Charlesbenitaganesh Campos   How do you get to doctors appointments? family or friend will provide   Are you on dialysis? No   Do you take coumadin? No   Discharge Plan A Home with family   Discharge Plan B Hospice/home   DME  Needed Upon Discharge  none   Transition of Care Barriers None

## 2024-03-13 NOTE — ASSESSMENT & PLAN NOTE
- EF normal, though severe aortic stenosis noted on echo. Contributes to hospice qualification.   - Diuresis and management per primary team

## 2024-03-13 NOTE — PROGRESS NOTES
[facility], [facility]  Nurses Note -- 4 Eyes      3/13/2024       Skin assessed on: Q Shift      [x] No Pressure Injuries Present    []Prevention Measures Documented    [] Yes LDA  for Pressure Injury Previously documented     [] Yes New Pressure Injury Discovered   [] LDA for New Pressure Injury Added      Attending RN:  Bill Steve RN     Second RN:  Gabriella Belcher RN

## 2024-03-13 NOTE — NURSING
[facility], [facility]  Nurses Note -- 4 Eyes      3/12/2024       Skin assessed on: Admit      [x] No Pressure Injuries Present    [x]Prevention Measures Documented    [] Yes LDA  for Pressure Injury Previously documented     [] Yes New Pressure Injury Discovered   [] LDA for New Pressure Injury Added      Attending RN:  Gabriella Foy RN     Second RN:  SHELLY Herrera
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
## 2024-03-13 NOTE — ASSESSMENT & PLAN NOTE
"- Consult for support and advance care planning in elderly pt currently in hospital with acute respiratory failure. Chart reviewed in depth; discussed pt with admitting provider (Dr Guerra) prior to visit.   - Met with pt at bedside; she was alert, sitting up on side of bed, and appeared comfortable. Present in room was her wonderfully supportive son, Cathleen Campos, who is her second oldest child. While son speaks some English, had Thai interpretor on phone for our visit. Introduced role of palliative medicine, and learned more about pt outside of hospital. She moved to the  from [place] in , and had a total of 16 children (several are ). Currently, she lives with two of her sons. While previously she was ambulatory, son asked if she can get a wheelchair for at home as "her legs are so weak."   - Thorough medical update provided; discussed that pt has several chronic conditions, and is very likely to have further episodes of shortness of breath or hospitalizations as a result of these. Encouraged the family to have a big picture discussion about what is most important to pt moving forward; did let him know that she qualifies for hospice care, should goals become comfort-focused. Provided an overview of this philosophy of care, and what it would look like in the home setting.   - For now, would continue with current level of care, though pt's son Cathleen will talk to pt and family about possibility of home hospice at discharge. He asked if I was coming back to talk to them again, so I let him know I would follow up with him tmrw.   - Updated primary team and SW/CM following visit  "
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
## 2024-03-13 NOTE — HOSPITAL COURSE
89F with pmh of CKD, T2DM,  HLD, HTN, presents to the ED for worsening shortness of breath that started this morning. Daughter at bedside states she will translate. According to daughter pt at baseline mostly stays in bed, but able to get up and walk to the bathroom on her own. Over the past couple of days she has been much less active at home and has been c/o worsening sob especially with exertion. Pts daughter also noted bilateral le swelling that started about the same time and was new for the pt. No known sick contacts. Pt without fever, chills, n/v, abd pain, cp, cough. Pt initially on bipap, but able to transition to NC stating she is feeling much better with her breathing now.   Hold  lasix for RASHAAD,will monitor,consulted PT,OT.ddi well HH and DME at DC time is arranged,RASHAAD is improved.  At DC time patient was stable on RA with no sign of respiratory distress,patient was discharged home with po lasix and follow up with PCP as out patient.   For wheelchair,  Wheelchair  has a mobility limitation that significantly impairs her ability to participate in one or more mobility related activities of daily living (MRADL's) such as toileting, feeding, dressing, grooming, and bathing in customary locations in the home. The mobility limitation cannot be sufficiently resolved by the use of a cane or walker. The use of a manual wheelchair will significantly improve the patient's ability to participate in MRADLS and the patient will use it on regular basis in the home.has expressed her willingness to use a manual wheelchair in the home. She also has a caregiver who is available, willing, and able to provide assistance with the wheelchair when needed

## 2024-03-14 ENCOUNTER — HOSPITAL ENCOUNTER (OUTPATIENT)
Facility: HOSPITAL | Age: 89
Discharge: HOSPICE | End: 2024-03-15
Attending: EMERGENCY MEDICINE | Admitting: STUDENT IN AN ORGANIZED HEALTH CARE EDUCATION/TRAINING PROGRAM
Payer: MEDICARE

## 2024-03-14 VITALS
HEIGHT: 55 IN | OXYGEN SATURATION: 95 % | WEIGHT: 80.94 LBS | BODY MASS INDEX: 18.73 KG/M2 | DIASTOLIC BLOOD PRESSURE: 65 MMHG | RESPIRATION RATE: 14 BRPM | SYSTOLIC BLOOD PRESSURE: 135 MMHG | TEMPERATURE: 98 F | HEART RATE: 74 BPM

## 2024-03-14 DIAGNOSIS — R07.9 CHEST PAIN, UNSPECIFIED TYPE: ICD-10-CM
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
## 2024-03-14 DIAGNOSIS — R51.9 ACUTE NONINTRACTABLE HEADACHE, UNSPECIFIED HEADACHE TYPE: ICD-10-CM

## 2024-03-14 DIAGNOSIS — I50.9 HEART FAILURE: ICD-10-CM

## 2024-03-14 DIAGNOSIS — J18.9 HAP (HOSPITAL-ACQUIRED PNEUMONIA): Primary | ICD-10-CM

## 2024-03-14 DIAGNOSIS — R06.00 DYSPNEA: ICD-10-CM

## 2024-03-14 DIAGNOSIS — N39.0 URINARY TRACT INFECTION WITHOUT HEMATURIA, SITE UNSPECIFIED: ICD-10-CM

## 2024-03-14 LAB
ALBUMIN SERPL BCP-MCNC: 3.3 G/DL (ref 3.5–5.2)
ALP SERPL-CCNC: 51 U/L (ref 55–135)
ALT SERPL W/O P-5'-P-CCNC: 9 U/L (ref 10–44)
ANION GAP SERPL CALC-SCNC: 7 MMOL/L (ref 8–16)
ANION GAP SERPL CALC-SCNC: 9 MMOL/L (ref 8–16)
AST SERPL-CCNC: 19 U/L (ref 10–40)
BACTERIA #/AREA URNS HPF: ABNORMAL /HPF
BASOPHILS # BLD AUTO: 0.08 K/UL (ref 0–0.2)
BASOPHILS NFR BLD: 0.4 % (ref 0–1.9)
BILIRUB SERPL-MCNC: 0.5 MG/DL (ref 0.1–1)
BILIRUB UR QL STRIP: NEGATIVE
BNP SERPL-MCNC: 412 PG/ML (ref 0–99)
BUN SERPL-MCNC: 33 MG/DL (ref 10–30)
BUN SERPL-MCNC: 36 MG/DL (ref 10–30)
CALCIUM SERPL-MCNC: 9 MG/DL (ref 8.7–10.5)
CALCIUM SERPL-MCNC: 9.3 MG/DL (ref 8.7–10.5)
CHLORIDE SERPL-SCNC: 107 MMOL/L (ref 95–110)
CHLORIDE SERPL-SCNC: 108 MMOL/L (ref 95–110)
CLARITY UR: ABNORMAL
CO2 SERPL-SCNC: 26 MMOL/L (ref 23–29)
CO2 SERPL-SCNC: 26 MMOL/L (ref 23–29)
COLOR UR: YELLOW
CREAT SERPL-MCNC: 1.4 MG/DL (ref 0.5–1.4)
CREAT SERPL-MCNC: 1.5 MG/DL (ref 0.5–1.4)
CTP QC/QA: YES
CTP QC/QA: YES
D DIMER PPP IA.FEU-MCNC: 1.07 MG/L FEU
DIFFERENTIAL METHOD BLD: ABNORMAL
EOSINOPHIL # BLD AUTO: 0.1 K/UL (ref 0–0.5)
EOSINOPHIL NFR BLD: 0.5 % (ref 0–8)
ERYTHROCYTE [DISTWIDTH] IN BLOOD BY AUTOMATED COUNT: 15.7 % (ref 11.5–14.5)
EST. GFR  (NO RACE VARIABLE): 32 ML/MIN/1.73 M^2
EST. GFR  (NO RACE VARIABLE): 35 ML/MIN/1.73 M^2
GLUCOSE SERPL-MCNC: 110 MG/DL (ref 70–110)
GLUCOSE SERPL-MCNC: 129 MG/DL (ref 70–110)
GLUCOSE UR QL STRIP: NEGATIVE
HCT VFR BLD AUTO: 33.1 % (ref 37–48.5)
HGB BLD-MCNC: 10.3 G/DL (ref 12–16)
HGB UR QL STRIP: ABNORMAL
HYALINE CASTS #/AREA URNS LPF: 0 /LPF
IMM GRANULOCYTES # BLD AUTO: 0.1 K/UL (ref 0–0.04)
IMM GRANULOCYTES NFR BLD AUTO: 0.5 % (ref 0–0.5)
KETONES UR QL STRIP: NEGATIVE
LEUKOCYTE ESTERASE UR QL STRIP: ABNORMAL
LIPASE SERPL-CCNC: 10 U/L (ref 4–60)
LYMPHOCYTES # BLD AUTO: 0.5 K/UL (ref 1–4.8)
LYMPHOCYTES NFR BLD: 2.8 % (ref 18–48)
MCH RBC QN AUTO: 28.6 PG (ref 27–31)
MCHC RBC AUTO-ENTMCNC: 31.1 G/DL (ref 32–36)
MCV RBC AUTO: 92 FL (ref 82–98)
MICROSCOPIC COMMENT: ABNORMAL
MONOCYTES # BLD AUTO: 0.6 K/UL (ref 0.3–1)
MONOCYTES NFR BLD: 3.4 % (ref 4–15)
NEUTROPHILS # BLD AUTO: 16.9 K/UL (ref 1.8–7.7)
NEUTROPHILS NFR BLD: 92.4 % (ref 38–73)
NITRITE UR QL STRIP: POSITIVE
NRBC BLD-RTO: 0 /100 WBC
PH UR STRIP: 7 [PH] (ref 5–8)
PLATELET # BLD AUTO: 225 K/UL (ref 150–450)
PMV BLD AUTO: 10.6 FL (ref 9.2–12.9)
POC MOLECULAR INFLUENZA A AGN: NEGATIVE
POC MOLECULAR INFLUENZA B AGN: NEGATIVE
POCT GLUCOSE: 109 MG/DL (ref 70–110)
POCT GLUCOSE: 126 MG/DL (ref 70–110)
POCT GLUCOSE: 136 MG/DL (ref 70–110)
POTASSIUM SERPL-SCNC: 4 MMOL/L (ref 3.5–5.1)
POTASSIUM SERPL-SCNC: 4.3 MMOL/L (ref 3.5–5.1)
PROT SERPL-MCNC: 7.3 G/DL (ref 6–8.4)
PROT UR QL STRIP: ABNORMAL
RBC # BLD AUTO: 3.6 M/UL (ref 4–5.4)
RBC #/AREA URNS HPF: >100 /HPF (ref 0–4)
SARS-COV-2 RDRP RESP QL NAA+PROBE: NEGATIVE
SODIUM SERPL-SCNC: 141 MMOL/L (ref 136–145)
SODIUM SERPL-SCNC: 142 MMOL/L (ref 136–145)
SP GR UR STRIP: 1.01 (ref 1–1.03)
TROPONIN I SERPL DL<=0.01 NG/ML-MCNC: 0.04 NG/ML (ref 0–0.03)
URN SPEC COLLECT METH UR: ABNORMAL
UROBILINOGEN UR STRIP-ACNC: NEGATIVE EU/DL
WBC # BLD AUTO: 18.36 K/UL (ref 3.9–12.7)
WBC #/AREA URNS HPF: 32 /HPF (ref 0–5)

## 2024-03-14 PROCEDURE — 87502 INFLUENZA DNA AMP PROBE: CPT

## 2024-03-14 PROCEDURE — 84484 ASSAY OF TROPONIN QUANT: CPT | Performed by: EMERGENCY MEDICINE

## 2024-03-14 PROCEDURE — 87088 URINE BACTERIA CULTURE: CPT | Performed by: EMERGENCY MEDICINE

## 2024-03-14 PROCEDURE — 87077 CULTURE AEROBIC IDENTIFY: CPT | Performed by: EMERGENCY MEDICINE

## 2024-03-14 PROCEDURE — 87635 SARS-COV-2 COVID-19 AMP PRB: CPT | Performed by: EMERGENCY MEDICINE

## 2024-03-14 PROCEDURE — 81000 URINALYSIS NONAUTO W/SCOPE: CPT | Performed by: EMERGENCY MEDICINE

## 2024-03-14 PROCEDURE — 83690 ASSAY OF LIPASE: CPT | Performed by: EMERGENCY MEDICINE

## 2024-03-14 PROCEDURE — 85025 COMPLETE CBC W/AUTO DIFF WBC: CPT | Performed by: EMERGENCY MEDICINE

## 2024-03-14 PROCEDURE — 85379 FIBRIN DEGRADATION QUANT: CPT | Performed by: EMERGENCY MEDICINE

## 2024-03-14 PROCEDURE — 83880 ASSAY OF NATRIURETIC PEPTIDE: CPT | Performed by: EMERGENCY MEDICINE

## 2024-03-14 PROCEDURE — 80048 BASIC METABOLIC PNL TOTAL CA: CPT | Mod: XB | Performed by: STUDENT IN AN ORGANIZED HEALTH CARE EDUCATION/TRAINING PROGRAM

## 2024-03-14 PROCEDURE — 36415 COLL VENOUS BLD VENIPUNCTURE: CPT | Performed by: STUDENT IN AN ORGANIZED HEALTH CARE EDUCATION/TRAINING PROGRAM

## 2024-03-14 PROCEDURE — 87086 URINE CULTURE/COLONY COUNT: CPT | Performed by: EMERGENCY MEDICINE

## 2024-03-14 PROCEDURE — 94761 N-INVAS EAR/PLS OXIMETRY MLT: CPT

## 2024-03-14 PROCEDURE — 99285 EMERGENCY DEPT VISIT HI MDM: CPT | Mod: 25

## 2024-03-14 PROCEDURE — 80053 COMPREHEN METABOLIC PANEL: CPT | Performed by: EMERGENCY MEDICINE

## 2024-03-14 PROCEDURE — 87186 SC STD MICRODIL/AGAR DIL: CPT | Performed by: EMERGENCY MEDICINE

## 2024-03-14 RX ORDER — ASPIRIN 81 MG/1
81 TABLET ORAL DAILY
Qty: 30 TABLET | Refills: 0
Start: 2024-03-14 | End: 2025-03-14

## 2024-03-14 RX ORDER — FUROSEMIDE 20 MG/1
20 TABLET ORAL 2 TIMES DAILY
Qty: 60 TABLET | Refills: 2 | Status: SHIPPED | OUTPATIENT
Start: 2024-03-14 | End: 2024-06-12

## 2024-03-14 RX ORDER — TALC
6 POWDER (GRAM) TOPICAL NIGHTLY PRN
Status: DISCONTINUED | OUTPATIENT
Start: 2024-03-14 | End: 2024-03-14 | Stop reason: HOSPADM

## 2024-03-14 RX ORDER — ACETAMINOPHEN 325 MG/1
650 TABLET ORAL EVERY 6 HOURS PRN
Status: DISCONTINUED | OUTPATIENT
Start: 2024-03-14 | End: 2024-03-14 | Stop reason: HOSPADM

## 2024-03-14 NOTE — NURSING
AVS virtually reviewed with patient's son in its entirety via Amharic  Ric #[id number], with emphasis on medications, follow-up appointments and reasons to return to the ED or contact the [facility] On Call Nurse Care Line.  Education complete and patient's family member voiced understanding. All questions answered. Discharge teaching complete.

## 2024-03-14 NOTE — NURSING
[facility] [place] -- 4 Eyes      3/13/2024       Skin assessed on: Q Shift      [x] No Pressure Injuries Present    [x]Prevention Measures Documented    [] Yes LDA  for Pressure Injury Previously documented     [] Yes New Pressure Injury Discovered   [] LDA for New Pressure Injury Added      Attending RN:  Imelda Escalante, RN     Second RN:  Bill SHUKLA RN

## 2024-03-14 NOTE — NURSING
Daughter at bedside requesting that lab come back later because does not want to wake mom right now since she didn't sleep much last night. Lab will come back at 0800.

## 2024-03-14 NOTE — PLAN OF CARE
[facility] [place]      HOME HEALTH ORDERS  FACE TO FACE ENCOUNTER    Patient Name: Veronica Campos  YOB: 1928    PCP: Yudi, Primary Doctor   PCP Address: None  PCP Phone Number: None  PCP Fax: None    Encounter Date: 3/12/24    Admit to Home Health    Diagnoses:  Active Hospital Problems    Diagnosis  POA    *Acute hypoxemic respiratory failure [J96.01]  Yes    Severe protein-calorie malnutrition [E43]  Yes    Advance care planning [Z71.89]  Not Applicable    RASHAAD (acute kidney injury) [N17.9]  Yes    Aortic stenosis [I35.0]  Yes    Acute diastolic congestive heart failure [I50.31]  Yes    Type 2 diabetes mellitus, controlled [E11.9]  Yes    Combined hyperlipidemia associated with type 2 diabetes mellitus [E11.69, E78.2]  Yes    Hypertension associated with diabetes [E11.59, I15.2]  Yes      Resolved Hospital Problems   No resolved problems to display.       Follow Up Appointments:  No future appointments.    Allergies:Review of patient's allergies indicates:  No Known Allergies    Medications: Review discharge medications with patient and family and provide education.    Current Facility-Administered Medications   Medication Dose Route Frequency Provider Last Rate Last Admin    acetaminophen tablet 650 mg  650 mg Oral Q6H PRN Denis Bautista PA-C        aspirin EC tablet 81 mg  81 mg Oral Daily Inocencio Guerra III, MD   81 mg at 03/13/24 0852    atorvastatin tablet 10 mg  10 mg Oral Daily Inocencio Guerra III, MD   10 mg at 03/13/24 0852    dextrose 10% bolus 125 mL 125 mL  12.5 g Intravenous PRN Inocencio Guerra III, MD        dextrose 10% bolus 250 mL 250 mL  25 g Intravenous PRN Inocencio Guerra III, MD        glucagon (human recombinant) injection 1 mg  1 mg Intramuscular PRN Inocencio Guerra III, MD        glucose chewable tablet 16 g  16 g Oral PRN Inocencio Guerra III, MD        glucose chewable tablet 24 g  24 g Oral PRN Inocencio Guerra III, MD        insulin aspart U-100 pen 0-5  Units  0-5 Units Subcutaneous QID (AC + HS) PRN Inocencio Guerra III, MD        melatonin tablet 6 mg  6 mg Oral Nightly PRN Denis Bautista PA-C        mupirocin 2 % ointment   Nasal BID Paradise Drake MD   Given at 03/13/24 2121    sodium chloride 0.9% flush 10 mL  10 mL Intravenous PRN Inocencio Guerra III, MD         Current Discharge Medication List        START taking these medications    Details   furosemide (LASIX) 20 MG tablet Take 1 tablet (20 mg total) by mouth 2 (two) times daily.  Qty: 60 tablet, Refills: 2           CONTINUE these medications which have CHANGED    Details   aspirin (ECOTRIN) 81 MG EC tablet Take 1 tablet (81 mg total) by mouth once daily.  Qty: 30 tablet, Refills: 0           CONTINUE these medications which have NOT CHANGED    Details   atorvastatin (LIPITOR) 10 MG tablet Take 1 tablet (10 mg total) by mouth once daily.  Qty: 90 tablet, Refills: 1    Associated Diagnoses: Combined hyperlipidemia associated with type 2 diabetes mellitus      SURE COMFORT LANCETS 30 gauge Misc 2 (two) times daily.           STOP taking these medications       metformin (FORTAMET) 500 mg 24 hr tablet Comments:   Reason for Stopping:         nifedipine (ADALAT CC) 60 MG TbSR Comments:   Reason for Stopping:                 I have seen and examined this patient within the last 30 days. My clinical findings that support the need for the home health skilled services and home bound status are the following:no   Weakness/numbness causing balance and gait disturbance due to Weakness/Debility making it taxing to leave home.     Diet:   2 gram sodium diet and soft diet        Referrals/ Consults  Physical Therapy to evaluate and treat. Evaluate for home safety and equipment needs; Establish/upgrade home exercise program. Perform / instruct on therapeutic exercises, gait training, transfer training, and Range of Motion.  Occupational Therapy to evaluate and treat. Evaluate home environment for safety and  equipment needs. Perform/Instruct on transfers, ADL training, ROM, and therapeutic exercises.    Activities:   activity as tolerated    Nursing:   Agency to admit patient within 24 hours of hospital discharge unless specified on physician order or at patient request    SN to complete comprehensive assessment including routine vital signs. Instruct on disease process and s/s of complications to report to MD. Review/verify medication list sent home with the patient at time of discharge  and instruct patient/caregiver as needed. Frequency may be adjusted depending on start of care date.     Skilled nurse to perform up to 3 visits PRN for symptoms related to diagnosis    Notify MD if SBP > 160 or < 90; DBP > 90 or < 50; HR > 120 or < 50; Temp > 101; O2 < 88%; Other:       Ok to schedule additional visits based on staff availability and patient request on consecutive days within the home health episode.    When multiple disciplines ordered:    Start of Care occurs on Sunday - Wednesday schedule remaining discipline evaluations as ordered on separate consecutive days following the start of care.    Thursday SOC -schedule subsequent evaluations Friday and Monday the following week.     Friday - Saturday SOC - schedule subsequent discipline evaluations on consecutive days starting Monday of the following week.    For all post-discharge communication and subsequent orders please contact patient's primary care physician. If unable to reach primary care physician or do not receive response within 30 minutes, please contact  [facility]  for clinical staff order clarification    Miscellaneous   Routine Skin for Bedridden Patients: Instruct patient/caregiver to apply moisture barrier cream to all skin folds and wet areas in perineal area daily and after baths and all bowel movements.    Home Health Aide:  Nursing Twice weekly, Physical Therapy Twice weekly, and Occupational Therapy Twice weekly    Wound Care Orders  no    I certify  that this patient is confined to her home and needs intermittent skilled nursing care, physical therapy, and occupational therapy.

## 2024-03-14 NOTE — ASSESSMENT & PLAN NOTE
"3/14/24  - Chart and interval history reviewed  - Visited with pt at bedside;     3/13/24  - Consult for support and advance care planning in elderly pt currently in hospital with acute respiratory failure. Chart reviewed in depth; discussed pt with admitting provider (Dr Guerra) prior to visit.   - Met with pt at bedside; she was alert, sitting up on side of bed, and appeared comfortable. Present in room was her wonderfully supportive son, Cathleen Campos, who is her second oldest child. While son speaks some English, had Somali interpretor on phone for our visit. Introduced role of palliative medicine, and learned more about pt outside of hospital. She moved to the  from [facility] in , and had a total of 16 children (several are ). Currently, she lives with two of her sons. While previously she was ambulatory, son asked if she can get a wheelchair for at home as "her legs are so weak."   - Thorough medical update provided; discussed that pt has several chronic conditions, and is very likely to have further episodes of shortness of breath or hospitalizations as a result of these. Encouraged the family to have a big picture discussion about what is most important to pt moving forward; did let him know that she qualifies for hospice care, should goals become comfort-focused. Provided an overview of this philosophy of care, and what it would look like in the home setting.   - For now, would continue with current level of care, though pt's son Cathleen will talk to pt and family about possibility of home hospice at discharge. He asked if I was coming back to talk to them again, so I let him know I would follow up with him tmrw.   - Updated primary team and SW/CM following visit  "

## 2024-03-14 NOTE — PT/OT/SLP PROGRESS
Physical Therapy      Patient Name:  Veronica Campos   MRN:  [id number]    Patient not seen today secondary to Other (family requested to let patient sleep/rest prior to going home ) BLE HEP provided to daughter . Patient is going home soon , no  follow-up needed .

## 2024-03-14 NOTE — PROGRESS NOTES
[facility] [place]  Nurses Note -- 4 Eyes      3/14/2024       Skin assessed on: Q Shift      [x] No Pressure Injuries Present    []Prevention Measures Documented    [] Yes LDA  for Pressure Injury Previously documented     [] Yes New Pressure Injury Discovered   [] LDA for New Pressure Injury Added      Attending RN:  Bill Steve RN     Second RN:  Imelda Escalante RN

## 2024-03-14 NOTE — SUBJECTIVE & OBJECTIVE
Interval History: No major issues overnight.     Medications:  Continuous Infusions:  Scheduled Meds:   aspirin  81 mg Oral Daily    atorvastatin  10 mg Oral Daily    mupirocin   Nasal BID     PRN Meds:acetaminophen, dextrose 10%, dextrose 10%, glucagon (human recombinant), glucose, glucose, insulin aspart U-100, melatonin, sodium chloride 0.9%    Objective:     Vital Signs (Most Recent):  Temp: 98.5 °F (36.9 °C) (03/14/24 0430)  Pulse: 69 (03/14/24 0747)  Resp: 16 (03/14/24 0747)  BP: (!) 117/52 (03/14/24 0430)  SpO2: (!) 93 % (03/14/24 0747) Vital Signs (24h Range):  Temp:  [97.9 °F (36.6 °C)-98.6 °F (37 °C)] 98.5 °F (36.9 °C)  Pulse:  [63-88] 69  Resp:  [16-17] 16  SpO2:  [90 %-99 %] 93 %  BP: (117-151)/(52-69) 117/52     Weight: 36.7 kg (80 lb 14.5 oz)  Body mass index is 18.8 kg/m².       Physical Exam  Constitutional:       Comments: Elderly and frail, appears comfortable    Pulmonary:      Effort: Pulmonary effort is normal.      Comments: NC oxygen       Significant Labs: All pertinent labs within the past 24 hours have been reviewed.  CBC:   Recent Labs   Lab 03/12/24  1113   WBC 10.21   HGB 10.6*   HCT 33.7*   MCV 93        BMP:  Recent Labs   Lab 03/14/24  0720         K 4.0      CO2 26   BUN 33*   CREATININE 1.4   CALCIUM 9.0     LFT:  Lab Results   Component Value Date    AST 19 03/12/2024    ALKPHOS 58 03/12/2024    BILITOT 0.6 03/12/2024     Albumin:   Albumin   Date Value Ref Range Status   03/12/2024 3.5 3.5 - 5.2 g/dL Final     Protein:   Total Protein   Date Value Ref Range Status   03/12/2024 7.7 6.0 - 8.4 g/dL Final     Lactic acid:   Lab Results   Component Value Date    LACTATE 2.1 07/18/2022    LACTATE 1.1 07/18/2022       Significant Imaging: I have reviewed all pertinent imaging results/findings within the past 24 hours.

## 2024-03-14 NOTE — PLAN OF CARE
Case Management Final Discharge Note    Discharge Disposition: [facility]    New DME ordered / company name: [facility]- wheel chair- pt's son declined.    Primary Caretaker and contact information: Hailey wren [phone]    Scheduled followup appointment: PCP scheduled    Transportation: Pt's family will provide transportation home when discharged.    Patient has declined to select a preferred provider and elects placement with the first accepting in network provider that is available to provide services as ordered by the physician.      Patient and family educated on discharge services and updated on DC plan. Bedside RN notified, patient clear to discharge from Case Management Perspective.       03/14/24 1119   Final Note   Assessment Type Final Discharge Note   Anticipated Discharge Disposition Home-Health   What phone number can be called within the next 1-3 days to see how you are doing after discharge? [phone]   Hospital Resources/Appts/Education Provided Appointments scheduled and added to AVS   Post-Acute Status   Post-Acute Authorization Home Health   Home Health Status Set-up Complete/Auth obtained   Coverage [facility] DUAL COMPLETE   Patient choice form signed by patient/caregiver List from System Post-Acute Care   Discharge Delays None known at this time

## 2024-03-14 NOTE — NURSING
C/o pain, generalized throughout body and requesting something for sleep. Notified Rashed P.A. Ordered Tylenol and melatonin during [place]. When tried to administer pt was asleep. Will return to pyxis.

## 2024-03-14 NOTE — PLAN OF CARE
03/14/24 0926   Post-Acute Status   Post-Acute Authorization Home Health   Home Health Status Pending post-acute provider review/more information requested   Coverage [facility] DUAL COMPLETE   Patient choice form signed by patient/caregiver List from System Post-Acute Care   Discharge Delays (!) Post-Acute Set-up   Discharge Plan   Discharge Plan A Home Health   Discharge Plan B Home with family

## 2024-03-14 NOTE — DISCHARGE SUMMARY
[facility] Medicine  Discharge Summary      Patient Name: Veronica Campos  MRN: [id number]  TORRI: [id number]  Patient Class: IP- Inpatient  Admission Date: 3/12/2024  Hospital Length of Stay: 2 days  Discharge Date and Time:  03/14/2024 10:22 AM  Attending Physician: Paradise Drake, *   Discharging Provider: Paradise Drake MD  Primary Care Provider: Yudi, Primary Doctor    Primary Care Team: Networked reference to record PCT     HPI:   89F with pmh of CKD, T2DM,  HLD, HTN, presents to the ED for worsening shortness of breath that started this morning. Daughter at bedside states she will translate. According to daughter pt at baseline mostly stays in bed, but able to get up and walk to the bathroom on her own. Over the past couple of days she has been much less active at home and has been c/o worsening sob especially with exertion. Pts daughter also noted bilateral le swelling that started about the same time and was new for the pt. No known sick contacts. Pt without fever, chills, n/v, abd pain, cp, cough. Pt initially on bipap, but able to transition to NC stating she is feeling much better with her breathing now.     * No surgery found *      Hospital Course:   89F with pmh of CKD, T2DM,  HLD, HTN, presents to the ED for worsening shortness of breath that started this morning. Daughter at bedside states she will translate. According to daughter pt at baseline mostly stays in bed, but able to get up and walk to the bathroom on her own. Over the past couple of days she has been much less active at home and has been c/o worsening sob especially with exertion. Pts daughter also noted bilateral le swelling that started about the same time and was new for the pt. No known sick contacts. Pt without fever, chills, n/v, abd pain, cp, cough. Pt initially on bipap, but able to transition to NC stating she is feeling much better with her breathing now.   Hold  lasix for RASHAAD,will  monitor,consulted PT,OT.ddi well HH and DME at DC time is arranged,RASHAAD is improved.  At DC time patient was stable on RA with no sign of respiratory distress,patient was discharged home with giuseppe mancillaix and follow up with PCP as out patient.   For wheelchair,  Wheelchair  has a mobility limitation that significantly impairs her ability to participate in one or more mobility related activities of daily living (MRADL's) such as toileting, feeding, dressing, grooming, and bathing in customary locations in the home. The mobility limitation cannot be sufficiently resolved by the use of a cane or walker. The use of a manual wheelchair will significantly improve the patient's ability to participate in MRADLS and the patient will use it on regular basis in the home.has expressed her willingness to use a manual wheelchair in the home. She also has a caregiver who is available, willing, and able to provide assistance with the wheelchair when needed      Goals of Care Treatment Preferences:  Code Status: Full Code      Consults:   Consults (From admission, onward)          Status Ordering Provider     Inpatient consult to Social Work/Case Management  Once        Provider:  (Not yet assigned)    Completed ERICH PEREIRA III     Inpatient consult to Registered Dietitian/Nutritionist  Once        Provider:  (Not yet assigned)    Completed ERICH PEREIRA III     Inpatient consult to Palliative Care  Once        Provider:  Linda De La Torre NP    Completed ERICH PEREIRA III            No new Assessment & Plan notes have been filed under this hospital service since the last note was generated.  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 Schedule an appointment as soon as possible for a visit.    Contact information:  [address]  [place] [address] [phone]                           Patient Instructions:      WHEELCHAIR FOR HOME USE     Order Specific Question Answer Comments   Hours in W/C per day: 99    Type of Wheelchair: Standard    Size(Width): 18"(STD adult)    Leg Support: STD footrests    Lap Belt: Velcro    Accessories: Anti-tippers    Cushion: Basic    Reclining Back No    Height: 4' 7" (1.397 m)    Weight: 36.7 kg (80 lb 14.5 oz)    Does patient have medical equipment at home? walker, rolling    Does patient have medical equipment at home? shower chair    Length of need (1-99 months): 99    Please check all that apply: Caregiver is capable and willing to operate wheelchair safely.      ACCEPT - Ambulatory referral/consult to Interventional Cardiology   Standing Status: Future   Referral Priority: Routine Referral Type: Consultation   Referral Reason: Specialty Services Required   Requested Specialty: Interventional Cardiology   Number of Visits Requested: 1     Activity as tolerated       Significant Diagnostic Studies: Labs: BMP:   Recent Labs   Lab 03/12/24  1113 03/13/24  0339 03/14/24  0720   * 96 110    144 142   K 4.4 3.9 4.0    104 107   CO2 22* 25 26   BUN 34* 34* 33*   CREATININE 1.2 1.5* 1.4   CALCIUM 9.2 9.1 9.0   MG 2.6  --   --    , CMP   Recent Labs   Lab 03/12/24  1113 " 03/13/24  0339 03/14/24  0720    144 142   K 4.4 3.9 4.0    104 107   CO2 22* 25 26   * 96 110   BUN 34* 34* 33*   CREATININE 1.2 1.5* 1.4   CALCIUM 9.2 9.1 9.0   PROT 7.7  --   --    ALBUMIN 3.5  --   --    BILITOT 0.6  --   --    ALKPHOS 58  --   --    AST 19  --   --    ALT 12  --   --    ANIONGAP 10 15 9   , and CBC   Recent Labs   Lab 03/12/24  1113   WBC 10.21   HGB 10.6*   HCT 33.7*        Radiology: X-Ray: CXR: X-Ray Chest 1 View (CXR): No results found for this visit on 03/12/24. and X-Ray Chest PA and Lateral (CXR): No results found for this visit on 03/12/24.  Cardiac Graphics: Echocardiogram: 2D echo with color flow doppler: No results found for this or any previous visit. and Transthoracic echo (TTE) complete (Cupid Only):   Results for orders placed or performed during the hospital encounter of 03/12/24   Echo   Result Value Ref Range    BSA 1.49 m2    LVOT stroke volume 69.46 cm3    LVIDd 4.45 3.5 - 6.0 cm    LV Systolic Volume 22.93 mL    LV Systolic Volume Index 15.3 mL/m2    LVIDs 2.53 2.1 - 4.0 cm    LV Diastolic Volume 90.03 mL    LV Diastolic Volume Index 60.02 mL/m2    IVS 1.11 (A) 0.6 - 1.1 cm    LVOT diameter 1.97 cm    LVOT area 3.0 cm2    FS 43 28 - 44 %    Left Ventricle Relative Wall Thickness 0.50 cm    Posterior Wall 1.11 (A) 0.6 - 1.1 cm    LV mass 174.17 g    LV Mass Index 116 g/m2    MV Peak E Dusty 2.39 m/s    TDI LATERAL 0.06 m/s    TDI SEPTAL 0.04 m/s    E/E' ratio 47.80 m/s    MV Peak A Dusty 1.55 m/s    TR Max Dusty 5.61 m/s    E/A ratio 1.54     IVRT 67.08 msec    E wave deceleration time 232.34 msec    LV SEPTAL E/E' RATIO 59.75 m/s    LV LATERAL E/E' RATIO 39.83 m/s    LVOT peak dusty 1.10 m/s    Left Ventricular Outflow Tract Mean Velocity 0.77 cm/s    Left Ventricular Outflow Tract Mean Gradient 2.76 mmHg    RVDD 3.63 cm    RV S' 16.44 cm/s    TAPSE 1.75 cm    LA size 4.73 cm    Left Atrium Minor Axis 6.85 cm    Left Atrium Major Axis 7.19 cm    RA Major  Axis 6.59 cm    AV mean gradient 36 mmHg    AV peak gradient 66 mmHg    Ao peak trista 4.05 m/s    Ao VTI 77.90 cm    LVOT peak VTI 22.80 cm    AV valve area 0.89 cm²    AV Velocity Ratio 0.27     AV index (prosthetic) 0.29     CALEB by Velocity Ratio 0.83 cm²    MV mean gradient 11 mmHg    MV peak gradient 26 mmHg    MV stenosis pressure 1/2 time 67.77 ms    MV valve area p 1/2 method 3.25 cm2    MV valve area by continuity eq 1.28 cm2    MV VTI 54.4 cm    TV peak gradient 70 mmHg    Triscuspid Valve Regurgitation Peak Gradient 126 mmHg    PV PEAK VELOCITY 1.29 m/s    PV peak gradient 7 mmHg    Sinus 2.86 cm    STJ 2.09 cm    Ascending aorta 3.06 cm    IVC diameter 1.73 cm    Mean e' 0.05 m/s    ZLVIDS -0.66     ZLVIDD 0.04     LA Volume Index 99.7 mL/m2    LA volume 149.50 cm3    LA WIDTH 5.3 cm    RA Width 4.4 cm    TV resting pulmonary artery pressure 129 mmHg    RV TB RVSP 9 mmHg    Est. RA pres 3 mmHg    Narrative      Left Ventricle: The left ventricle is normal in size. There is mild   concentric hypertrophy. There is hyperdynamic systolic function with a   visually estimated ejection fraction of 70 - 75%. Grade II diastolic   dysfunction.    Right Ventricle: Normal right ventricular cavity size. Systolic   function is normal.    Left Atrium: Left atrium is severely dilated.    Right Atrium: Right atrium is moderately dilated.    Aortic Valve: There is moderate to severe stenosis. Aortic valve area   by VTI is 0.89 cm². Aortic valve peak velocity is 4.05 m/s. Mean gradient   is 36 mmHg. The dimensionless index is 0.29.    Mitral Valve: There is mild stenosis. The mean pressure gradient across   the mitral valve is 11 mmHg at a heart rate of 94 bpm. There is moderate   to severe regurgitation.    Tricuspid Valve: There is moderate regurgitation.    Pulmonic Valve: There is mild regurgitation.    Pulmonary Artery: The estimated pulmonary artery systolic pressure is   129 mmHg.    IVC/SVC: Normal venous pressure at  3 mmHg.         Pending Diagnostic Studies:       None           Medications:  Reconciled Home Medications:      Medication List        START taking these medications      furosemide 20 MG tablet  Commonly known as: LASIX  Take 1 tablet (20 mg total) by mouth 2 (two) times daily.            CONTINUE taking these medications      aspirin 81 MG EC tablet  Commonly known as: ECOTRIN  Take 1 tablet (81 mg total) by mouth once daily.     atorvastatin 10 MG tablet  Commonly known as: LIPITOR  Take 1 tablet (10 mg total) by mouth once daily.     SURE COMFORT LANCETS 30 gauge Misc  Generic drug: lancets  2 (two) times daily.            STOP taking these medications      metFORMIN 500 mg 24hr tablet  Commonly known as: FORTAMET     NIFEdipine 60 MG Tbsr  Commonly known as: ADALAT CC              Indwelling Lines/Drains at time of discharge:   Lines/Drains/Airways       None                   Time spent on the discharge of patient: over 30  minutes         Paradise Drake MD  Department of Hospital Medicine  [facility] [place] - Telemetry

## 2024-03-15 VITALS
DIASTOLIC BLOOD PRESSURE: 55 MMHG | BODY MASS INDEX: 18.87 KG/M2 | HEART RATE: 60 BPM | TEMPERATURE: 98 F | OXYGEN SATURATION: 100 % | HEIGHT: 55 IN | WEIGHT: 81.56 LBS | RESPIRATION RATE: 16 BRPM | SYSTOLIC BLOOD PRESSURE: 121 MMHG

## 2024-03-15 PROBLEM — J18.1 LUNG CONSOLIDATION: Status: ACTIVE | Noted: 2024-03-15

## 2024-03-15 PROBLEM — N30.00 ACUTE CYSTITIS: Status: ACTIVE | Noted: 2024-03-15

## 2024-03-15 PROBLEM — J39.8 TRACHEAL DEVIATION: Status: ACTIVE | Noted: 2024-03-15

## 2024-03-15 PROBLEM — E07.9 THYROID MASS: Status: ACTIVE | Noted: 2024-03-15

## 2024-03-15 LAB
ALLENS TEST: ABNORMAL
DELSYS: ABNORMAL
FIO2: 21
HCO3 UR-SCNC: 27.9 MMOL/L (ref 24–28)
MAGNESIUM SERPL-MCNC: 2.3 MG/DL (ref 1.6–2.6)
MODE: ABNORMAL
PCO2 BLDA: 42.8 MMHG (ref 35–45)
PH SMN: 7.42 [PH] (ref 7.35–7.45)
PHOSPHATE SERPL-MCNC: 4.6 MG/DL (ref 2.7–4.5)
PO2 BLDA: 64 MMHG (ref 80–100)
POC BE: 3 MMOL/L
POC SATURATED O2: 92 % (ref 95–100)
POC TCO2: 29 MMOL/L (ref 23–27)
POCT GLUCOSE: 131 MG/DL (ref 70–110)
POCT GLUCOSE: 133 MG/DL (ref 70–110)
POCT GLUCOSE: 155 MG/DL (ref 70–110)
POCT GLUCOSE: 96 MG/DL (ref 70–110)
SAMPLE: ABNORMAL
SITE: ABNORMAL
SP02: 95

## 2024-03-15 PROCEDURE — 96367 TX/PROPH/DG ADDL SEQ IV INF: CPT

## 2024-03-15 PROCEDURE — 99417 PROLNG OP E/M EACH 15 MIN: CPT | Mod: ,,, | Performed by: INTERNAL MEDICINE

## 2024-03-15 PROCEDURE — 96365 THER/PROPH/DIAG IV INF INIT: CPT | Mod: 59

## 2024-03-15 PROCEDURE — 63600175 PHARM REV CODE 636 W HCPCS: Performed by: NURSE PRACTITIONER

## 2024-03-15 PROCEDURE — 25000003 PHARM REV CODE 250: Performed by: NURSE PRACTITIONER

## 2024-03-15 PROCEDURE — G0378 HOSPITAL OBSERVATION PER HR: HCPCS

## 2024-03-15 PROCEDURE — 25500020 PHARM REV CODE 255: Performed by: EMERGENCY MEDICINE

## 2024-03-15 PROCEDURE — 63600175 PHARM REV CODE 636 W HCPCS: Performed by: EMERGENCY MEDICINE

## 2024-03-15 PROCEDURE — 82962 GLUCOSE BLOOD TEST: CPT

## 2024-03-15 PROCEDURE — 99497 ADVNCD CARE PLAN 30 MIN: CPT | Mod: ,,, | Performed by: INTERNAL MEDICINE

## 2024-03-15 PROCEDURE — 25000003 PHARM REV CODE 250: Performed by: EMERGENCY MEDICINE

## 2024-03-15 PROCEDURE — 84100 ASSAY OF PHOSPHORUS: CPT

## 2024-03-15 PROCEDURE — 63600175 PHARM REV CODE 636 W HCPCS

## 2024-03-15 PROCEDURE — 82803 BLOOD GASES ANY COMBINATION: CPT

## 2024-03-15 PROCEDURE — 99215 OFFICE O/P EST HI 40 MIN: CPT | Mod: ,,, | Performed by: INTERNAL MEDICINE

## 2024-03-15 PROCEDURE — 96376 TX/PRO/DX INJ SAME DRUG ADON: CPT

## 2024-03-15 PROCEDURE — 36600 WITHDRAWAL OF ARTERIAL BLOOD: CPT

## 2024-03-15 PROCEDURE — 99900035 HC TECH TIME PER 15 MIN (STAT)

## 2024-03-15 PROCEDURE — 83735 ASSAY OF MAGNESIUM: CPT

## 2024-03-15 PROCEDURE — 25000003 PHARM REV CODE 250

## 2024-03-15 PROCEDURE — 96375 TX/PRO/DX INJ NEW DRUG ADDON: CPT | Mod: 59

## 2024-03-15 RX ORDER — INSULIN ASPART 100 [IU]/ML
0-5 INJECTION, SOLUTION INTRAVENOUS; SUBCUTANEOUS
Status: DISCONTINUED | OUTPATIENT
Start: 2024-03-15 | End: 2024-03-15 | Stop reason: HOSPADM

## 2024-03-15 RX ORDER — FUROSEMIDE 10 MG/ML
40 INJECTION INTRAMUSCULAR; INTRAVENOUS
Status: COMPLETED | OUTPATIENT
Start: 2024-03-15 | End: 2024-03-15

## 2024-03-15 RX ORDER — NALOXONE HCL 0.4 MG/ML
0.02 VIAL (ML) INJECTION
Status: DISCONTINUED | OUTPATIENT
Start: 2024-03-15 | End: 2024-03-15 | Stop reason: HOSPADM

## 2024-03-15 RX ORDER — AMOXICILLIN AND CLAVULANATE POTASSIUM 875; 125 MG/1; MG/1
1 TABLET, FILM COATED ORAL EVERY 12 HOURS
Qty: 14 TABLET | Refills: 0 | Status: SHIPPED | OUTPATIENT
Start: 2024-03-15 | End: 2024-03-22

## 2024-03-15 RX ORDER — ASPIRIN 81 MG/1
81 TABLET ORAL DAILY
Status: DISCONTINUED | OUTPATIENT
Start: 2024-03-15 | End: 2024-03-15 | Stop reason: HOSPADM

## 2024-03-15 RX ORDER — FUROSEMIDE 10 MG/ML
20 INJECTION INTRAMUSCULAR; INTRAVENOUS DAILY
Status: DISCONTINUED | OUTPATIENT
Start: 2024-03-15 | End: 2024-03-15 | Stop reason: HOSPADM

## 2024-03-15 RX ORDER — IBUPROFEN 200 MG
16 TABLET ORAL
Status: DISCONTINUED | OUTPATIENT
Start: 2024-03-15 | End: 2024-03-15 | Stop reason: HOSPADM

## 2024-03-15 RX ORDER — ENOXAPARIN SODIUM 100 MG/ML
30 INJECTION SUBCUTANEOUS EVERY 24 HOURS
Status: DISCONTINUED | OUTPATIENT
Start: 2024-03-15 | End: 2024-03-15 | Stop reason: HOSPADM

## 2024-03-15 RX ORDER — ATORVASTATIN CALCIUM 10 MG/1
10 TABLET, FILM COATED ORAL DAILY
Status: DISCONTINUED | OUTPATIENT
Start: 2024-03-15 | End: 2024-03-15 | Stop reason: HOSPADM

## 2024-03-15 RX ORDER — POLYETHYLENE GLYCOL 3350 17 G/17G
17 POWDER, FOR SOLUTION ORAL DAILY
Qty: 100 EACH | Refills: 0 | Status: SHIPPED | OUTPATIENT
Start: 2024-03-15

## 2024-03-15 RX ORDER — FUROSEMIDE 20 MG/1
20 TABLET ORAL 2 TIMES DAILY
Status: DISCONTINUED | OUTPATIENT
Start: 2024-03-15 | End: 2024-03-15

## 2024-03-15 RX ORDER — IBUPROFEN 200 MG
24 TABLET ORAL
Status: DISCONTINUED | OUTPATIENT
Start: 2024-03-15 | End: 2024-03-15 | Stop reason: HOSPADM

## 2024-03-15 RX ORDER — ACETAMINOPHEN 325 MG/1
650 TABLET ORAL EVERY 4 HOURS PRN
Status: DISCONTINUED | OUTPATIENT
Start: 2024-03-15 | End: 2024-03-15 | Stop reason: HOSPADM

## 2024-03-15 RX ORDER — TALC
6 POWDER (GRAM) TOPICAL NIGHTLY PRN
Status: DISCONTINUED | OUTPATIENT
Start: 2024-03-15 | End: 2024-03-15 | Stop reason: HOSPADM

## 2024-03-15 RX ORDER — POLYETHYLENE GLYCOL 3350 17 G/17G
17 POWDER, FOR SOLUTION ORAL DAILY
Status: DISCONTINUED | OUTPATIENT
Start: 2024-03-15 | End: 2024-03-15 | Stop reason: HOSPADM

## 2024-03-15 RX ORDER — ONDANSETRON HYDROCHLORIDE 2 MG/ML
4 INJECTION, SOLUTION INTRAVENOUS EVERY 8 HOURS PRN
Status: DISCONTINUED | OUTPATIENT
Start: 2024-03-15 | End: 2024-03-15 | Stop reason: HOSPADM

## 2024-03-15 RX ORDER — GLUCAGON 1 MG
1 KIT INJECTION
Status: DISCONTINUED | OUTPATIENT
Start: 2024-03-15 | End: 2024-03-15 | Stop reason: HOSPADM

## 2024-03-15 RX ORDER — SODIUM CHLORIDE 0.9 % (FLUSH) 0.9 %
10 SYRINGE (ML) INJECTION
Status: DISCONTINUED | OUTPATIENT
Start: 2024-03-15 | End: 2024-03-15 | Stop reason: HOSPADM

## 2024-03-15 RX ADMIN — PIPERACILLIN AND TAZOBACTAM 4.5 G: 4; .5 INJECTION, POWDER, LYOPHILIZED, FOR SOLUTION INTRAVENOUS; PARENTERAL at 03:03

## 2024-03-15 RX ADMIN — ASPIRIN 81 MG: 81 TABLET, COATED ORAL at 08:03

## 2024-03-15 RX ADMIN — ATORVASTATIN CALCIUM 10 MG: 10 TABLET, FILM COATED ORAL at 08:03

## 2024-03-15 RX ADMIN — POLYETHYLENE GLYCOL 3350 17 G: 17 POWDER, FOR SOLUTION ORAL at 08:03

## 2024-03-15 RX ADMIN — AZITHROMYCIN 500 MG: 500 INJECTION, POWDER, LYOPHILIZED, FOR SOLUTION INTRAVENOUS at 08:03

## 2024-03-15 RX ADMIN — FUROSEMIDE 40 MG: 10 INJECTION, SOLUTION INTRAVENOUS at 03:03

## 2024-03-15 RX ADMIN — CEFTRIAXONE 1 G: 1 INJECTION, POWDER, FOR SOLUTION INTRAMUSCULAR; INTRAVENOUS at 01:03

## 2024-03-15 RX ADMIN — IOHEXOL 75 ML: 350 INJECTION, SOLUTION INTRAVENOUS at 12:03

## 2024-03-15 RX ADMIN — FUROSEMIDE 20 MG: 10 INJECTION, SOLUTION INTRAMUSCULAR; INTRAVENOUS at 08:03

## 2024-03-15 RX ADMIN — ACETAMINOPHEN 650 MG: 325 TABLET ORAL at 09:03

## 2024-03-15 NOTE — ADMISSIONCARE
AdmissionCare    Guideline: General Observation, Observation    Based on the indications selected for the patient, the bed status of Observation was determined to be MET    The following indications were selected as present at the time of evaluation of the patient:      - Clinical care (eg, testing, monitoring, or treatment) needed beyond the usual emergency department time frame (eg, 3 to 4 hours)   - Clinical care needed is not appropriate for a lower level of care (ie, discharge to outpatient setting not appropriate).   - Patient has clinical condition for which observation care is needed, as indicated by 1 or more of the following:    - Pulmonary condition or finding (eg, dyspnea, Tachypnea, wheezing, respiratory acidosis, exacerbation of cystic fibrosis, hemoptysis, respiratory tract obstruction)    AdmissionCare documentation entered by: Gopal Jennings    [facility], 27th edition, Copyright © 2023 [facility], [facility] All Rights Reserved.  [id number]:00

## 2024-03-15 NOTE — PLAN OF CARE
Message received from Dr Damon Palliative care stating that the pt will need to be set up with home hospice at time of discharge.  Preferably an agency with a Turkish speaking nurse and believes that [facility] may have one.    0949- message sent to Arelis with [facility] to inquire if they have a Turkish speaking nurse.  Arelis reports that the nurse is no longer with them.    0950-  message sent to Balbina with  [facility] to inquire if they have a Turkish speaking nurse. Balbina reports they do not.    0958- message sent to Arabella with [facility] to inquire if they have a Turkish speaking home hospice nurse.  Arabella reports they do not at this time    0959- message sent to Chadd with [facility] to inquire if they have a Turkish speaking nurse for home hospice.  Chadd reports they do not have a nurse but they do use a translating service.    message sent to Gretel with [facility] to inquire if they have a Turkish speaking home hospice nurse.  Gretel reports they do not at this time      Call placed to pts son Bernabe to discuss d/c planning.  No answer at this time and mailbox full, unable to leave message [place] to continue to call back.  Text message sent requesting call back with name and contact information
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
## 2024-03-15 NOTE — ASSESSMENT & PLAN NOTE
-Patient presents with complaints of dyspnea with ambulation.  Was recently seen in the hospital and discharged yesterday for CHF exacerbation.  Patient afebrile with leukocytosis WBC 18  -dysuria, frequency, urgency, suprapubic pain, hematuria. Afebrile/febrile with/without leukocytosis.  - UA: 3+ leuks, positive nitrite, 32 WBCs, 1+ prtein  - empiric ceftriaxone 1g daily   - urine culture pending

## 2024-03-15 NOTE — ED TRIAGE NOTES
Pt BIB EMS co of fatigue and SOB. Pt was recently DC from upstairs today, and pt daughter says she seemed lethargic and having a hard time breathing. Pt has HX of HTN and arthritis.

## 2024-03-15 NOTE — ASSESSMENT & PLAN NOTE
- Consult for support, continuity of care, and advance care planning in pt that was discharged less than 12 hours ago; she arrived home and was lethargic and short of breath. Chart reviewed in depth; extensively discussed pt in person with primary team.   - Along with hospital medicine team, met with pt at bedside; she looked much more tired than other day, and looked uncomfortable due to pain. Using a Uzbek phone interpretor, spoke to her very supportive son Cathleen at bedside, whom I met the other day.   - Thorough medical update provided, and discussed plan moving forward. Based on our conversation, he is agreeable to SW/CM setting up home hospice informational visit, as he would like for main focus of pt's care to be comfort. He requested that I have a similar conversation with his brother Bernabe (other main caretaker of patient); Bernabe was also in agreement with plan for home hospice.   - Code status updated to DNR/DNI as part of our overall plan to avoid unneccessary suffering moving forward

## 2024-03-15 NOTE — PROGRESS NOTES
Pharmacist Renal Dose Adjustment Note    Veronica Campos is a 89 y.o. female being treated with the medication Zosyn 4.5gm    Patient Data:    Vital Signs (Most Recent):  Temp: 97.9 °F (36.6 °C) (03/14/24 2042)  Pulse: 79 (03/14/24 2303)  Resp: 18 (03/14/24 2303)  BP: (!) 160/68 (03/14/24 2303)  SpO2: 100 % (03/14/24 2303) Vital Signs (72h Range):  Temp:  [97 °F (36.1 °C)-98.6 °F (37 °C)]   Pulse:  []   Resp:  [14-31]   BP: (117-164)/(50-73)   SpO2:  [90 %-100 %]      Recent Labs   Lab 03/13/24  0339 03/14/24  0720 03/14/24  2144   CREATININE 1.5* 1.4 1.5*     Serum creatinine: 1.5 mg/dL (H) 03/14/24 2144  Estimated creatinine clearance: 13 mL/min (A)    Medication:Zosyn 4.5gm q8h ordered.  CrCl is 13.  As per Renal Dose Adjustment per Pharmacy Protocol, Zosyn frequency will be renally adjusted to 4.5gm q12h as per protocol.    Pharmacist's Name: Christopher Grossman  Pharmacist's Extension: [phone]

## 2024-03-15 NOTE — ASSESSMENT & PLAN NOTE
Chronic, controlled. Latest blood pressure and vitals reviewed-     Temp:  [97.9 °F (36.6 °C)-98.6 °F (37 °C)]   Pulse:  [66-82]   Resp:  [14-18]   BP: (134-160)/(50-68)   SpO2:  [90 %-100 %] .   Home meds for hypertension were reviewed and noted below.   Hypertension Medications               furosemide (LASIX) 20 MG tablet Take 1 tablet (20 mg total) by mouth 2 (two) times daily.            While in the hospital, will manage blood pressure as follows; Continue home antihypertensive regimen    Will utilize p.r.n. blood pressure medication only if patient's blood pressure greater than 180/110 and she develops symptoms such as worsening chest pain or shortness of breath.

## 2024-03-15 NOTE — PLAN OF CARE
ADT 30 order placed for Stretcher Transportation.  Requested  time: 4:30pm  If transportation does not arrive at ETA time nurse will be instructed to follow protocol for transportation below:  How can I get in touch directly with dispatch, if needed?                 Non-emergent (stretcher): [phone]  option 6     ++NURSING:  If Stretcher does not arrive at requested time please call the above Non Emergent Dispatcher.  If issue not resolved please escalate to your charge nurse for further instructions.       Pts nurse Bill notified    Call placed to pts siena Connolly to advise of time transportation is requested to transport pt to home.  No additional questions at this time

## 2024-03-15 NOTE — CONSULTS
Food & Nutrition  Education    Diet Education: Low Salt Diet - Fluid Restriction    Learners: Veronica Campos      Nutrition Education provided with handouts: Low Salt/ Fluid Restrictive Diet      Comments: RD consult for education fluid restriction low salt diet. Pt sleeping at time of visit. Educations attached to pt chart for in person follow up with onsite RD    Please reconsult as needed    Thanks!     Herlinda Gary, Registration Eligible, Provisional LDN

## 2024-03-15 NOTE — DISCHARGE SUMMARY
[facility]  Discharge Summary      Patient Name: Veronica Campos  MRN: [id number]  TORRI: [id number]  Patient Class: OP- Observation  Admission Date: 3/14/2024  Hospital Length of Stay: 0 days  Discharge Date and Time:  03/15/2024 12:19 PM  Attending Physician: Inocencio Guerra III, MD   Discharging Provider: Madeleine Mcclendon NP  Primary Care Provider: Vivian Campos MD    Primary Care Team: Networked reference to record PCT     HPI:   Veronica Campos is a 89 y.o. with a pmh of CHF, DM, CKD, HTN, HLD, presents to the hospital with complaints of dyspnea and headache. History provided by independent historian, daughter Linda at bedside. Patient was admitted to the hospital yesterday for acute hypoxic respiratory failure and acute onset of CHF and was discharged earlier today.  However patient returns due to experiencing continue dyspnea with her home O2 sats reading in 80s to 90s.  Patient also experiencing persistent headaches, chest discomfort which prompted her to come to the ED for evaluation again.  Patient denies any other exacerbating or alleviating factor.    In the ED:  Patient afebrile with leukocytosis WBC 18, hypotensive on presentation 135/50, 97% O2 sat on 2 L via NC, H and H 10/33, D-dimer 1.07, creatinine 1.5 GFR 32 , troponin 0.042, UA with positive nitrites and 3+ leukocyte esterase, CTA chest with no evidence of PE with bilateral small pleural effusions greater on right and basilar atelectasis and consolidation on the right.  CT head shows no acute intracranial abnormality.  Chest x-ray shows decreased interstitial edema and persistent small effusion left base with continued blunting the costophrenic angle.  Patient given Rocephin 1 g IV, Lasix 40 mg IV, Zosyn 4.5 g IV, and vancomycin 500 mg IV in the ED.    Case discussed with ED provider.    Veronica Campos we will be placed under observation for further medical management.      * No surgery found *       Hospital Course:   89 year old female was admitted to the hospital yesterday for acute hypoxic respiratory failure and acute onset of CHF and was discharged earlier today. However patient returns due to experiencing continue dyspnea with her home O2 sats reading in 80s to 90s. Patient also experiencing persistent headaches, chest discomfort which prompted her to come to the ED for evaluation again. Patient found to have acute cystitis and lung  consolidation started on Rocephin and azithromycin switched to Augmentin bid on discharge. Patient now requiring 3L NC of O2. 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 Palliative care consulted. Long discussion with both of patient sons. Moving forward with hospice care. Patient accept to [facility]. Patient made DNR. XR of abdomen to r/o constipation showed Nonobstructive bowel gas pattern. There is formed stool in the rectum. Ordered tap water enema. Bowel regimen for home.      Goals of Care Treatment Preferences:  Code Status: DNR      Consults:   Consults (From admission, onward)          Status Ordering Provider     Inpatient consult to Social Work  Once        Provider:  (Not yet assigned)    Completed SEBASTIEN DAMON     Inpatient consult to Palliative Care  Once        Provider:  Sebastien Damon MD    Completed JOSSELINE YUN     Inpatient consult to Social Work/Case Management  Once        Provider:  (Not yet assigned)    Completed ALIN MULLER     Inpatient consult to Registered Dietitian/Nutritionist  Once        Provider:  (Not yet assigned)    Acknowledged ALIN MULLER            No new Assessment & Plan notes have been filed under this hospital service since the last note was generated.  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] 03/15/2024 Yes    RASHAAD (acute kidney injury) [N17.9] 03/13/2024 Yes    Severe protein-calorie malnutrition [E43] 03/13/2024 Yes    Advance care planning [Z71.89] 03/13/2024 Not Applicable    Acute hypoxemic respiratory failure [J96.01] 03/12/2024 Yes    Acute diastolic congestive heart failure [I50.31] 03/12/2024 Yes    Type 2 diabetes mellitus, controlled [E11.9] 12/27/2013 Yes    Hypertension associated with diabetes [E11.59, I15.2] 01/24/2013 Yes      Problems Resolved During this Admission:       Discharged Condition: stable    Disposition: Hospice/Medical Facility    Follow Up:   Follow-up Information       [facility] - Follow up.    Specialty: Hospice Services  Contact information:  [address] [place] [address]  [address]  [place] [address]  [phone]                           Patient Instructions:   No discharge procedures on file.    Significant Diagnostic Studies: Labs: BMP:   Recent Labs   Lab 03/14/24  0720 03/14/24  2144 03/15/24  0438    129*  --     141  --    K 4.0 4.3  --     108  --    CO2 26 26  --    BUN 33* 36*  --    CREATININE 1.4 1.5*  --    CALCIUM 9.0 9.3  --    MG  --   --  2.3   , CMP   Recent Labs   Lab 03/14/24 0720 03/14/24 2144    141   K 4.0 4.3    108   CO2 26 26    129*   BUN 33* 36*   CREATININE 1.4 1.5*   CALCIUM 9.0 9.3   PROT  --  7.3   ALBUMIN  --  3.3*   BILITOT  --  0.5   ALKPHOS  --  51*   AST  --  19   ALT  --  9*   ANIONGAP 9 7*   , CBC   Recent Labs   Lab 03/14/24 2144   WBC 18.36*   HGB 10.3*   HCT 33.1*      , INR No results found for: "INR", "PROTIME", Lipid Panel   Lab Results   Component Value Date    CHOL 138 09/12/2013    HDL 38 (L) 09/12/2013    LDLCALC 65.0 09/12/2013    TRIG 175 (H) 09/12/2013    CHOLHDL 27.5 09/12/2013   , Troponin   Recent Labs   Lab 03/12/24  1113 03/14/24  2144   TROPONINI <0.006 0.042*   , A1C:   Recent Labs   Lab " 03/12/24  1610   HGBA1C 5.4   , and All labs within the past 24 hours have been reviewed  Radiology: X-Ray: CXR: X-Ray Chest 1 View (CXR): No results found for this visit on 03/14/24. and X-Ray Chest PA and Lateral (CXR): No results found for this visit on 03/14/24. and KUB: X-Ray Abdomen AP 1 View (KUB):   Results for orders placed or performed during the hospital encounter of 03/14/24   X-Ray Abdomen AP 1 View    Narrative    EXAMINATION:  XR ABDOMEN AP 1 VIEW    CLINICAL HISTORY:  r/o constipation;    TECHNIQUE:  AP View(s) of the abdomen was performed.    COMPARISON:  CT abdomen pelvis 07/18/2022    FINDINGS:  Nonobstructive bowel gas pattern.  There is formed stool in the rectum.    Excreted contrast in the renal collecting systems bilaterally.  Vascular calcifications.  Subcutaneous calcifications in the gluteal regions bilaterally.    Dense mitral annular calcifications.  Bilateral pleural effusions.    Degenerative changes in the spine and pelvis.      Impression    As above.      Electronically signed by: Kwabena Murillo  Date:    03/15/2024  Time:    10:35       Pending Diagnostic Studies:       Procedure Component Value Units Date/Time    EKG 12-lead [[id number]]     Order Status: Sent Lab Status: No result            Medications:  Reconciled Home Medications:      Medication List        START taking these medications      amoxicillin-clavulanate 875-125mg 875-125 mg per tablet  Commonly known as: AUGMENTIN  Take 1 tablet by mouth every 12 (twelve) hours. for 7 days     polyethylene glycol 17 gram Pwpk  Commonly known as: GLYCOLAX  Take 17 g by mouth once daily.            CONTINUE taking these medications      aspirin 81 MG EC tablet  Commonly known as: ECOTRIN  Take 1 tablet (81 mg total) by mouth once daily.     atorvastatin 10 MG tablet  Commonly known as: LIPITOR  Take 1 tablet (10 mg total) by mouth once daily.     furosemide 20 MG tablet  Commonly known as: LASIX  Take 1 tablet (20 mg total) by mouth 2  (two) times daily.     SURE COMFORT LANCETS 30 gauge Misc  Generic drug: lancets  2 (two) times daily.              Indwelling Lines/Drains at time of discharge:   Lines/Drains/Airways       Drain  Duration             Female External Urinary Catheter w/ Suction 03/14/24 6218 <1 day                    Time spent on the discharge of patient: 90 minutes         Madeleine Mcclendon NP  Department of Hospital Medicine  [facility], later [facility]

## 2024-03-15 NOTE — ASSESSMENT & PLAN NOTE
Patient with Hypoxic Respiratory failure which is Acute on chronic.  she is not on home oxygen. Supplemental oxygen was provided and noted-      .   Signs/symptoms of respiratory failure include- tachypnea, increased work of breathing, and respiratory distress. Contributing diagnoses includes - CHF Labs and images were reviewed. Patient Has not had a recent ABG. Will treat underlying causes and adjust management of respiratory failure as follows- appears to be acute on chronic CHF exacerbation with elevated BNP  -Chest x-ray shows continued small effusion at the left lung base with interstitial edema  -patient on 2 L oxygen via NC, continue to wean O2 as tolerated  -Patient on CHF pathway and continue IV diuresis  -Osvaldo sosa

## 2024-03-15 NOTE — ASSESSMENT & PLAN NOTE
Patient's FSGs are controlled on current medication regimen.  Last A1c reviewed-   Lab Results   Component Value Date    HGBA1C 5.4 03/12/2024     Most recent fingerstick glucose reviewed-   Recent Labs   Lab 03/14/24  0910 03/14/24  1201   POCTGLUCOSE 109 136*     Current correctional scale  Low  Maintain anti-hyperglycemic dose as follows-   Antihyperglycemics (From admission, onward)      Start     Stop Route Frequency Ordered    03/15/24 0430  insulin aspart U-100 pen 0-5 Units         -- SubQ Before meals & nightly PRN 03/15/24 0330          Hold Oral hypoglycemics while patient is in the hospital.
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
## 2024-03-15 NOTE — CONSULTS
Message received from Balbina with [facility] stating that she is on her way to the hospital to meet with pts son Bernabe at bedside. Balbina to follow up with TN post meeting

## 2024-03-15 NOTE — PLAN OF CARE
Call received from Balbina with [facility] stating she met with brother Cathleen at bedside and he is in agreement with hospice care and will complete paperwork.    1150- call placed to Cathleen at [phone] to discuss d/c plan and advise that the pt will d/c to home on today with hospice services.  Verbalized understanding.  TN inquired how pt will transport to home.  He has requested transportation be scheduled for the pt.  TN confirmed address pt will be going to as that on the facesheet. Requested that family call when equipment delivered to the home so that transportation can be arranged.    Message sent to Balbina with [facility] to go ahead and get the equipment needed ordered and delivered to the home in preparation for d/c to home on today.  Balbina advised that the equipment has been ordered and they have a 4 hour guarantee delivery time and transportation can be arranged for  anytime after 4pm.  TN to schedule transportation for 4:30pm.

## 2024-03-15 NOTE — HPI
Veronica Campos is a 89 y.o. with a pmh of CHF, DM, CKD, HTN, HLD, presents to the hospital with complaints of dyspnea and headache. History provided by independent historian, daughter Linda at bedside. Patient was admitted to the hospital yesterday for acute hypoxic respiratory failure and acute onset of CHF and was discharged earlier today.  However patient returns due to experiencing continue dyspnea with her home O2 sats reading in 80s to 90s.  Patient also experiencing persistent headaches, chest discomfort which prompted her to come to the ED for evaluation again.  Patient denies any other exacerbating or alleviating factor.    In the ED:  Patient afebrile with leukocytosis WBC 18, hypotensive on presentation 135/50, 97% O2 sat on 2 L via NC, H and H 10/33, D-dimer 1.07, creatinine 1.5 GFR 32 , troponin 0.042, UA with positive nitrites and 3+ leukocyte esterase, CTA chest with no evidence of PE with bilateral small pleural effusions greater on right and basilar atelectasis and consolidation on the right.  CT head shows no acute intracranial abnormality.  Chest x-ray shows decreased interstitial edema and persistent small effusion left base with continued blunting the costophrenic angle.  Patient given Rocephin 1 g IV, Lasix 40 mg IV, Zosyn 4.5 g IV, and vancomycin 500 mg IV in the ED.    Case discussed with ED provider.    Veronica Campos we will be placed under observation for further medical management.

## 2024-03-15 NOTE — PROGRESS NOTES
[facility]  Nurses Note -- 4 Eyes      3/15/2024       Skin assessed on: Admit      [x] No Pressure Injuries Present    []Prevention Measures Documented    [] Yes LDA  for Pressure Injury Previously documented     [] Yes New Pressure Injury Discovered   [] LDA for New Pressure Injury Added      Attending RN:  Bill Steve RN     Second RN:  Vanessa Shrestha RN

## 2024-03-15 NOTE — ASSESSMENT & PLAN NOTE
-Patient presents with dyspnea on ambulation.  Was recently discharged from hospital for CHF exacerbation.  Patient afebrile with leukocytosis WBC count 18.  CTA shows bilateral small pleural effusions greater on right and basilar atelectasis and consolidation on right  -Suspect hospital-acquired pneumonia due to patient's recent hospital stay versus secondary to continued CHF exacerbation. Patient is started on Zosyn and vancomycin in the ED    Plan:  -Switched to Azithromycin and Ceftriaxone for PNA coverage   -blood cultures pending  -oxygen as needed  -Osvaldo p.r.n.  -P.r.n. cough medications ordered  -Continue to monitor

## 2024-03-15 NOTE — ASSESSMENT & PLAN NOTE
- Currently on NC oxygen; likely multifactorial (aortic stenosis, pleural effusions, large thyroid mass causing tracheal deviation)  - Low dose PRN opioids for dyspnea   - Illness trajectory education provided to family
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
## 2024-03-15 NOTE — ASSESSMENT & PLAN NOTE
Patient is identified as having  unknown  heart failure that is Acute on chronic. CHF is currently uncontrolled due to Pulmonary edema/pleural effusion on CXR. Latest ECHO performed and demonstrates- Results for orders placed during the hospital encounter of 03/12/24    Echo  Unknown Interpretation Summary    Left Ventricle: The left ventricle is normal in size. There is mild concentric hypertrophy. There is hyperdynamic systolic function with a visually estimated ejection fraction of 70 - 75%. Grade II diastolic dysfunction.    Right Ventricle: Normal right ventricular cavity size. Systolic function is normal.    Left Atrium: Left atrium is severely dilated.    Right Atrium: Right atrium is moderately dilated.    Aortic Valve: There is moderate to severe stenosis. Aortic valve area by VTI is 0.89 cm². Aortic valve peak velocity is 4.05 m/s. Mean gradient is 36 mmHg. The dimensionless index is 0.29.    Mitral Valve: There is mild stenosis. The mean pressure gradient across the mitral valve is 11 mmHg at a heart rate of 94 bpm. There is moderate to severe regurgitation.    Tricuspid Valve: There is moderate regurgitation.    Pulmonic Valve: There is mild regurgitation.    Pulmonary Artery: The estimated pulmonary artery systolic pressure is 129 mmHg.    IVC/SVC: Normal venous pressure at 3 mmHg.  . Continue Furosemide and monitor clinical status closely. Monitor on telemetry. Patient is on CHF pathway.  Monitor strict Is&Os and daily weights.  Place on fluid restriction of 1.5 L. Cardiology has not been consulted. Continue to stress to patient importance of self efficacy and  on diet for CHF. Last BNP reviewed- and noted below   Recent Labs   Lab 03/14/24  0124   *     -continue IV diuresis with Lasix

## 2024-03-15 NOTE — HOSPITAL COURSE
89 year old female was admitted to the hospital yesterday for acute hypoxic respiratory failure and acute onset of CHF and was discharged earlier today. However patient returns due to experiencing continue dyspnea with her home O2 sats reading in 80s to 90s. Patient also experiencing persistent headaches, chest discomfort which prompted her to come to the ED for evaluation again. Patient found to have acute cystitis and lung  consolidation started on Rocephin and azithromycin switched to Augmentin bid on discharge. Patient now requiring 3L NC of O2. 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 Palliative care consulted. Long discussion with both of patient sons. Moving forward with hospice care. Patient accept to [facility]. Patient made DNR. XR of abdomen to r/o constipation showed Nonobstructive bowel gas pattern. There is formed stool in the rectum. Ordered tap water enema. Bowel regimen for home.
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
## 2024-03-18 LAB — BACTERIA UR CULT: ABNORMAL

## 2024-06-17 PROBLEM — N17.9 AKI (ACUTE KIDNEY INJURY): Status: RESOLVED | Noted: 2024-03-13 | Resolved: 2024-06-17

## 2024-06-17 PROBLEM — J96.01 ACUTE HYPOXEMIC RESPIRATORY FAILURE: Status: RESOLVED | Noted: 2024-03-12 | Resolved: 2024-06-17
